# Patient Record
Sex: FEMALE | Race: WHITE | NOT HISPANIC OR LATINO | Employment: OTHER | URBAN - METROPOLITAN AREA
[De-identification: names, ages, dates, MRNs, and addresses within clinical notes are randomized per-mention and may not be internally consistent; named-entity substitution may affect disease eponyms.]

---

## 2018-01-30 ENCOUNTER — TRANSCRIBE ORDERS (OUTPATIENT)
Dept: ADMINISTRATIVE | Facility: HOSPITAL | Age: 78
End: 2018-01-30

## 2018-01-30 DIAGNOSIS — Z00.00 GENERAL MEDICAL EXAM: Primary | ICD-10-CM

## 2018-02-05 ENCOUNTER — HOSPITAL ENCOUNTER (OUTPATIENT)
Dept: BONE DENSITY | Facility: HOSPITAL | Age: 78
Discharge: HOME | End: 2018-02-05
Admitting: FAMILY MEDICINE

## 2018-02-05 DIAGNOSIS — Z00.00 GENERAL MEDICAL EXAM: ICD-10-CM

## 2018-02-05 PROCEDURE — 77080 DXA BONE DENSITY AXIAL: CPT

## 2022-10-12 ENCOUNTER — APPOINTMENT (OUTPATIENT)
Dept: GENERAL RADIOLOGY | Facility: HOSPITAL | Age: 82
End: 2022-10-12

## 2022-10-12 ENCOUNTER — HOSPITAL ENCOUNTER (INPATIENT)
Facility: HOSPITAL | Age: 82
LOS: 6 days | Discharge: HOME | End: 2022-10-18
Attending: EMERGENCY MEDICINE | Admitting: INTERNAL MEDICINE

## 2022-10-12 DIAGNOSIS — I51.81 TAKOTSUBO CARDIOMYOPATHY: ICD-10-CM

## 2022-10-12 DIAGNOSIS — I24.9 ACUTE CORONARY SYNDROME: ICD-10-CM

## 2022-10-12 DIAGNOSIS — R09.02 HYPOXIA: ICD-10-CM

## 2022-10-12 DIAGNOSIS — I44.2 HEART BLOCK AV COMPLETE: ICD-10-CM

## 2022-10-12 DIAGNOSIS — I21.3 ST ELEVATION MYOCARDIAL INFARCTION (STEMI), UNSPECIFIED ARTERY: ICD-10-CM

## 2022-10-12 DIAGNOSIS — I44.2 COMPLETE HEART BLOCK: Primary | ICD-10-CM

## 2022-10-12 PROBLEM — I34.0 NONRHEUMATIC MITRAL VALVE REGURGITATION: Status: ACTIVE | Noted: 2022-10-12

## 2022-10-12 PROBLEM — I44.7 LBBB (LEFT BUNDLE BRANCH BLOCK): Status: ACTIVE | Noted: 2022-10-12

## 2022-10-12 LAB
ALBUMIN SERPL-MCNC: 4.6 G/DL (ref 3.5–5.2)
ALBUMIN/GLOB SERPL: 1.7 G/DL
ALP SERPL-CCNC: 80 U/L (ref 39–117)
ALT SERPL W P-5'-P-CCNC: 10 U/L (ref 1–33)
ANION GAP SERPL CALCULATED.3IONS-SCNC: 19.2 MMOL/L (ref 5–15)
AST SERPL-CCNC: 35 U/L (ref 1–32)
BASOPHILS # BLD AUTO: 0.08 10*3/MM3 (ref 0–0.2)
BASOPHILS NFR BLD AUTO: 0.5 % (ref 0–1.5)
BILIRUB SERPL-MCNC: 0.6 MG/DL (ref 0–1.2)
BUN SERPL-MCNC: 26 MG/DL (ref 8–23)
BUN/CREAT SERPL: 20.8 (ref 7–25)
CALCIUM SPEC-SCNC: 10.3 MG/DL (ref 8.6–10.5)
CHLORIDE SERPL-SCNC: 103 MMOL/L (ref 98–107)
CO2 SERPL-SCNC: 16.8 MMOL/L (ref 22–29)
CREAT SERPL-MCNC: 1.25 MG/DL (ref 0.57–1)
DEPRECATED RDW RBC AUTO: 41.4 FL (ref 37–54)
EGFRCR SERPLBLD CKD-EPI 2021: 43.4 ML/MIN/1.73
EOSINOPHIL # BLD AUTO: 0.08 10*3/MM3 (ref 0–0.4)
EOSINOPHIL NFR BLD AUTO: 0.5 % (ref 0.3–6.2)
ERYTHROCYTE [DISTWIDTH] IN BLOOD BY AUTOMATED COUNT: 12.2 % (ref 12.3–15.4)
GLOBULIN UR ELPH-MCNC: 2.7 GM/DL
GLUCOSE BLDC GLUCOMTR-MCNC: 199 MG/DL (ref 70–130)
GLUCOSE SERPL-MCNC: 223 MG/DL (ref 65–99)
HCT VFR BLD AUTO: 47.3 % (ref 34–46.6)
HCT VFR BLDA CALC: 40 % (ref 38–51)
HGB BLD-MCNC: 16 G/DL (ref 12–15.9)
HGB BLDA-MCNC: 13.6 G/DL (ref 12–17)
IMM GRANULOCYTES # BLD AUTO: 0.09 10*3/MM3 (ref 0–0.05)
IMM GRANULOCYTES NFR BLD AUTO: 0.5 % (ref 0–0.5)
LIPASE SERPL-CCNC: 36 U/L (ref 13–60)
LYMPHOCYTES # BLD AUTO: 2.71 10*3/MM3 (ref 0.7–3.1)
LYMPHOCYTES NFR BLD AUTO: 16.3 % (ref 19.6–45.3)
MCH RBC QN AUTO: 31.6 PG (ref 26.6–33)
MCHC RBC AUTO-ENTMCNC: 33.8 G/DL (ref 31.5–35.7)
MCV RBC AUTO: 93.5 FL (ref 79–97)
MONOCYTES # BLD AUTO: 0.67 10*3/MM3 (ref 0.1–0.9)
MONOCYTES NFR BLD AUTO: 4 % (ref 5–12)
NEUTROPHILS NFR BLD AUTO: 13.01 10*3/MM3 (ref 1.7–7)
NEUTROPHILS NFR BLD AUTO: 78.2 % (ref 42.7–76)
NRBC BLD AUTO-RTO: 0 /100 WBC (ref 0–0.2)
NT-PROBNP SERPL-MCNC: 1242 PG/ML (ref 0–1800)
PLATELET # BLD AUTO: 287 10*3/MM3 (ref 140–450)
PMV BLD AUTO: 11.8 FL (ref 6–12)
POTASSIUM SERPL-SCNC: 5.1 MMOL/L (ref 3.5–5.2)
PROT SERPL-MCNC: 7.3 G/DL (ref 6–8.5)
QT INTERVAL: 573 MS
RBC # BLD AUTO: 5.06 10*6/MM3 (ref 3.77–5.28)
SAO2 % BLDA: 95 % (ref 95–98)
SODIUM SERPL-SCNC: 139 MMOL/L (ref 136–145)
TROPONIN T SERPL-MCNC: 0.77 NG/ML (ref 0–0.03)
WBC NRBC COR # BLD: 16.64 10*3/MM3 (ref 3.4–10.8)

## 2022-10-12 PROCEDURE — 80053 COMPREHEN METABOLIC PANEL: CPT | Performed by: EMERGENCY MEDICINE

## 2022-10-12 PROCEDURE — 93458 L HRT ARTERY/VENTRICLE ANGIO: CPT | Performed by: INTERNAL MEDICINE

## 2022-10-12 PROCEDURE — C1894 INTRO/SHEATH, NON-LASER: HCPCS | Performed by: INTERNAL MEDICINE

## 2022-10-12 PROCEDURE — 25010000002 MORPHINE SULFATE (PF) 2 MG/ML SOLUTION: Performed by: INTERNAL MEDICINE

## 2022-10-12 PROCEDURE — 82962 GLUCOSE BLOOD TEST: CPT

## 2022-10-12 PROCEDURE — 25010000002 DIPHENHYDRAMINE PER 50 MG: Performed by: EMERGENCY MEDICINE

## 2022-10-12 PROCEDURE — 0 IOPAMIDOL PER 1 ML: Performed by: INTERNAL MEDICINE

## 2022-10-12 PROCEDURE — 84484 ASSAY OF TROPONIN QUANT: CPT | Performed by: EMERGENCY MEDICINE

## 2022-10-12 PROCEDURE — 85014 HEMATOCRIT: CPT

## 2022-10-12 PROCEDURE — 5A1223Z PERFORMANCE OF CARDIAC PACING, CONTINUOUS: ICD-10-PCS | Performed by: INTERNAL MEDICINE

## 2022-10-12 PROCEDURE — 25010000002 DOPAMINE PER 40 MG: Performed by: INTERNAL MEDICINE

## 2022-10-12 PROCEDURE — 93010 ELECTROCARDIOGRAM REPORT: CPT | Performed by: INTERNAL MEDICINE

## 2022-10-12 PROCEDURE — 25010000002 FUROSEMIDE PER 20 MG: Performed by: INTERNAL MEDICINE

## 2022-10-12 PROCEDURE — 25010000002 ONDANSETRON PER 1 MG: Performed by: EMERGENCY MEDICINE

## 2022-10-12 PROCEDURE — 25010000002 DOPAMINE PER 40 MG

## 2022-10-12 PROCEDURE — 99223 1ST HOSP IP/OBS HIGH 75: CPT | Performed by: INTERNAL MEDICINE

## 2022-10-12 PROCEDURE — 82810 BLOOD GASES O2 SAT ONLY: CPT

## 2022-10-12 PROCEDURE — C1769 GUIDE WIRE: HCPCS | Performed by: INTERNAL MEDICINE

## 2022-10-12 PROCEDURE — 25010000002 METHYLPREDNISOLONE PER 125 MG: Performed by: EMERGENCY MEDICINE

## 2022-10-12 PROCEDURE — 4A023N7 MEASUREMENT OF CARDIAC SAMPLING AND PRESSURE, LEFT HEART, PERCUTANEOUS APPROACH: ICD-10-PCS | Performed by: INTERNAL MEDICINE

## 2022-10-12 PROCEDURE — 94640 AIRWAY INHALATION TREATMENT: CPT

## 2022-10-12 PROCEDURE — 85025 COMPLETE CBC W/AUTO DIFF WBC: CPT | Performed by: EMERGENCY MEDICINE

## 2022-10-12 PROCEDURE — 25010000002 HEPARIN (PORCINE) PER 1000 UNITS: Performed by: INTERNAL MEDICINE

## 2022-10-12 PROCEDURE — 93005 ELECTROCARDIOGRAM TRACING: CPT | Performed by: EMERGENCY MEDICINE

## 2022-10-12 PROCEDURE — 25010000002 MORPHINE PER 10 MG: Performed by: INTERNAL MEDICINE

## 2022-10-12 PROCEDURE — 99284 EMERGENCY DEPT VISIT MOD MDM: CPT

## 2022-10-12 PROCEDURE — 94799 UNLISTED PULMONARY SVC/PX: CPT

## 2022-10-12 PROCEDURE — B2111ZZ FLUOROSCOPY OF MULTIPLE CORONARY ARTERIES USING LOW OSMOLAR CONTRAST: ICD-10-PCS | Performed by: INTERNAL MEDICINE

## 2022-10-12 PROCEDURE — 85018 HEMOGLOBIN: CPT

## 2022-10-12 PROCEDURE — 83690 ASSAY OF LIPASE: CPT | Performed by: EMERGENCY MEDICINE

## 2022-10-12 PROCEDURE — 71045 X-RAY EXAM CHEST 1 VIEW: CPT

## 2022-10-12 PROCEDURE — 94660 CPAP INITIATION&MGMT: CPT

## 2022-10-12 PROCEDURE — 83880 ASSAY OF NATRIURETIC PEPTIDE: CPT | Performed by: EMERGENCY MEDICINE

## 2022-10-12 PROCEDURE — B2151ZZ FLUOROSCOPY OF LEFT HEART USING LOW OSMOLAR CONTRAST: ICD-10-PCS | Performed by: INTERNAL MEDICINE

## 2022-10-12 RX ORDER — METHYLPREDNISOLONE SODIUM SUCCINATE 125 MG/2ML
125 INJECTION, POWDER, LYOPHILIZED, FOR SOLUTION INTRAMUSCULAR; INTRAVENOUS ONCE
Status: COMPLETED | OUTPATIENT
Start: 2022-10-12 | End: 2022-10-12

## 2022-10-12 RX ORDER — DOPAMINE HYDROCHLORIDE 160 MG/100ML
2-20 INJECTION, SOLUTION INTRAVENOUS
Status: DISCONTINUED | OUTPATIENT
Start: 2022-10-12 | End: 2022-10-13

## 2022-10-12 RX ORDER — NALOXONE HCL 0.4 MG/ML
0.4 VIAL (ML) INJECTION
Status: DISCONTINUED | OUTPATIENT
Start: 2022-10-12 | End: 2022-10-12

## 2022-10-12 RX ORDER — DOPAMINE HYDROCHLORIDE 160 MG/100ML
INJECTION, SOLUTION INTRAVENOUS
Status: COMPLETED
Start: 2022-10-12 | End: 2022-10-12

## 2022-10-12 RX ORDER — HEPARIN SODIUM 1000 [USP'U]/ML
INJECTION, SOLUTION INTRAVENOUS; SUBCUTANEOUS
Status: DISCONTINUED | OUTPATIENT
Start: 2022-10-12 | End: 2022-10-12 | Stop reason: HOSPADM

## 2022-10-12 RX ORDER — ONDANSETRON 2 MG/ML
4 INJECTION INTRAMUSCULAR; INTRAVENOUS EVERY 6 HOURS PRN
Status: DISCONTINUED | OUTPATIENT
Start: 2022-10-12 | End: 2022-10-18 | Stop reason: HOSPADM

## 2022-10-12 RX ORDER — ALBUTEROL SULFATE 2.5 MG/3ML
2.5 SOLUTION RESPIRATORY (INHALATION) EVERY 6 HOURS PRN
Status: DISCONTINUED | OUTPATIENT
Start: 2022-10-12 | End: 2022-10-12

## 2022-10-12 RX ORDER — IPRATROPIUM BROMIDE AND ALBUTEROL SULFATE 2.5; .5 MG/3ML; MG/3ML
3 SOLUTION RESPIRATORY (INHALATION)
Status: DISCONTINUED | OUTPATIENT
Start: 2022-10-12 | End: 2022-10-14

## 2022-10-12 RX ORDER — NALOXONE HCL 0.4 MG/ML
0.4 VIAL (ML) INJECTION
Status: DISCONTINUED | OUTPATIENT
Start: 2022-10-12 | End: 2022-10-13

## 2022-10-12 RX ORDER — IPRATROPIUM BROMIDE AND ALBUTEROL SULFATE 2.5; .5 MG/3ML; MG/3ML
3 SOLUTION RESPIRATORY (INHALATION) EVERY 4 HOURS PRN
Status: DISCONTINUED | OUTPATIENT
Start: 2022-10-12 | End: 2022-10-18 | Stop reason: HOSPADM

## 2022-10-12 RX ORDER — FUROSEMIDE 10 MG/ML
20 INJECTION INTRAMUSCULAR; INTRAVENOUS ONCE
Status: COMPLETED | OUTPATIENT
Start: 2022-10-12 | End: 2022-10-12

## 2022-10-12 RX ORDER — ONDANSETRON 2 MG/ML
4 INJECTION INTRAMUSCULAR; INTRAVENOUS ONCE
Status: COMPLETED | OUTPATIENT
Start: 2022-10-12 | End: 2022-10-12

## 2022-10-12 RX ORDER — DIPHENHYDRAMINE HYDROCHLORIDE 50 MG/ML
25 INJECTION INTRAMUSCULAR; INTRAVENOUS ONCE
Status: COMPLETED | OUTPATIENT
Start: 2022-10-12 | End: 2022-10-12

## 2022-10-12 RX ORDER — SODIUM CHLORIDE 0.9 % (FLUSH) 0.9 %
10 SYRINGE (ML) INJECTION AS NEEDED
Status: DISCONTINUED | OUTPATIENT
Start: 2022-10-12 | End: 2022-10-18 | Stop reason: HOSPADM

## 2022-10-12 RX ORDER — BUPIVACAINE HYDROCHLORIDE 5 MG/ML
INJECTION, SOLUTION EPIDURAL; INTRACAUDAL
Status: DISCONTINUED | OUTPATIENT
Start: 2022-10-12 | End: 2022-10-12 | Stop reason: HOSPADM

## 2022-10-12 RX ORDER — MORPHINE SULFATE 2 MG/ML
INJECTION, SOLUTION INTRAMUSCULAR; INTRAVENOUS
Status: DISCONTINUED | OUTPATIENT
Start: 2022-10-12 | End: 2022-10-12 | Stop reason: HOSPADM

## 2022-10-12 RX ORDER — ONDANSETRON 4 MG/1
4 TABLET, FILM COATED ORAL EVERY 6 HOURS PRN
Status: DISCONTINUED | OUTPATIENT
Start: 2022-10-12 | End: 2022-10-18 | Stop reason: HOSPADM

## 2022-10-12 RX ORDER — FAMOTIDINE 10 MG/ML
20 INJECTION, SOLUTION INTRAVENOUS ONCE
Status: COMPLETED | OUTPATIENT
Start: 2022-10-12 | End: 2022-10-12

## 2022-10-12 RX ORDER — MORPHINE SULFATE 2 MG/ML
2 INJECTION, SOLUTION INTRAMUSCULAR; INTRAVENOUS EVERY 4 HOURS PRN
Status: DISCONTINUED | OUTPATIENT
Start: 2022-10-12 | End: 2022-10-13

## 2022-10-12 RX ORDER — MORPHINE SULFATE 2 MG/ML
2 INJECTION, SOLUTION INTRAMUSCULAR; INTRAVENOUS ONCE
Status: DISCONTINUED | OUTPATIENT
Start: 2022-10-12 | End: 2022-10-18 | Stop reason: HOSPADM

## 2022-10-12 RX ORDER — DOPAMINE HYDROCHLORIDE 160 MG/100ML
INJECTION, SOLUTION INTRAVENOUS
Status: DISCONTINUED | OUTPATIENT
Start: 2022-10-12 | End: 2022-10-12 | Stop reason: HOSPADM

## 2022-10-12 RX ORDER — MORPHINE SULFATE 2 MG/ML
1 INJECTION, SOLUTION INTRAMUSCULAR; INTRAVENOUS EVERY 4 HOURS PRN
Status: DISCONTINUED | OUTPATIENT
Start: 2022-10-12 | End: 2022-10-12

## 2022-10-12 RX ORDER — HYDROCODONE BITARTRATE AND ACETAMINOPHEN 5; 325 MG/1; MG/1
1 TABLET ORAL EVERY 4 HOURS PRN
Status: DISCONTINUED | OUTPATIENT
Start: 2022-10-12 | End: 2022-10-18 | Stop reason: HOSPADM

## 2022-10-12 RX ADMIN — ONDANSETRON 4 MG: 2 INJECTION INTRAMUSCULAR; INTRAVENOUS at 18:56

## 2022-10-12 RX ADMIN — METHYLPREDNISOLONE SODIUM SUCCINATE 125 MG: 125 INJECTION, POWDER, FOR SOLUTION INTRAMUSCULAR; INTRAVENOUS at 19:17

## 2022-10-12 RX ADMIN — FAMOTIDINE 20 MG: 10 INJECTION INTRAVENOUS at 19:18

## 2022-10-12 RX ADMIN — DIPHENHYDRAMINE HYDROCHLORIDE 25 MG: 50 INJECTION, SOLUTION INTRAMUSCULAR; INTRAVENOUS at 19:17

## 2022-10-12 RX ADMIN — FUROSEMIDE 20 MG: 10 INJECTION, SOLUTION INTRAMUSCULAR; INTRAVENOUS at 21:29

## 2022-10-12 RX ADMIN — MORPHINE SULFATE 2 MG: 2 INJECTION, SOLUTION INTRAMUSCULAR; INTRAVENOUS at 22:48

## 2022-10-12 RX ADMIN — DOPAMINE HYDROCHLORIDE 10 MCG/KG/MIN: 40 INJECTION, SOLUTION, CONCENTRATE INTRAVENOUS at 19:21

## 2022-10-12 RX ADMIN — HYDROCODONE BITARTRATE AND ACETAMINOPHEN 1 TABLET: 5; 325 TABLET ORAL at 21:29

## 2022-10-12 RX ADMIN — ALBUTEROL SULFATE 2.5 MG: 2.5 SOLUTION RESPIRATORY (INHALATION) at 21:18

## 2022-10-12 RX ADMIN — DOPAMINE HYDROCHLORIDE 10 MCG/KG/MIN: 160 INJECTION, SOLUTION INTRAVENOUS at 19:21

## 2022-10-12 NOTE — ED PROVIDER NOTES
EMERGENCY DEPARTMENT ENCOUNTER    Room Number:  N330/1  Date of encounter:  10/12/2022  PCP: Radu Fisher PA  Historian: Patient      HPI:  Chief Complaint: Chest pain  A complete HPI/ROS/PMH/PSH/SH/FH are unobtainable due to: Poor historian    Context: Latoya Iqbal is a 82 y.o. female who presents to the ED c/o severe left-sided, aching chest pain that started earlier today and has been persistent.  She has shortness of breath, diaphoresis and feels dizzy.  Nothing makes it better or worse and it is been fairly constant.  She has taken an aspirin for it but otherwise nothing else.    Prior to today she was not having any of these symptoms and was in her usual state of health.  She does not have a history of coronary artery disease that she is aware of, stop smoking several decades ago but does say that she has been told that she has heart failure in the past.  She does not see a cardiologist and does not remember the last time she went to see a doctor.      PAST MEDICAL HISTORY  Active Ambulatory Problems     Diagnosis Date Noted   • No Active Ambulatory Problems     Resolved Ambulatory Problems     Diagnosis Date Noted   • No Resolved Ambulatory Problems     Past Medical History:   Diagnosis Date   • Cancer (HCC)    • CHF (congestive heart failure) (HCC)    • COPD (chronic obstructive pulmonary disease) (HCC)    • Elevated cholesterol    • GERD (gastroesophageal reflux disease)    • Hypertension          PAST SURGICAL HISTORY  Past Surgical History:   Procedure Laterality Date   • APPENDECTOMY      1977   • CARDIAC CATHETERIZATION     • HYSTERECTOMY      1977   • INSERTION OF TISSUE EXPANDER AFTER MASTECTOMY Left     1996   • MASTECTOMY      1977   • REPLACEMENT TOTAL HIP LATERAL POSITION Left     1978   • SKIN BIOPSY      multple over years   • TONSILECTOMY, ADENOIDECTOMY, BILATERAL MYRINGOTOMY AND TUBES      1962         FAMILY HISTORY  History reviewed. No pertinent family history.      SOCIAL  HISTORY  Social History     Socioeconomic History   • Marital status:    Tobacco Use   • Smoking status: Former     Packs/day: 3.00     Years: 50.00     Pack years: 150.00     Types: Cigarettes   • Smokeless tobacco: Never   Vaping Use   • Vaping Use: Never used   Substance and Sexual Activity   • Alcohol use: Never   • Drug use: Never   • Sexual activity: Defer         ALLERGIES  Cortisone, Iodine, Penicillins, and Procaine        REVIEW OF SYSTEMS  Review of Systems   Limited due to acuity of condition      PHYSICAL EXAM    I have reviewed the triage vital signs and nursing notes.    ED Triage Vitals   Temp Pulse Resp BP SpO2   -- -- -- -- --      Temp src Heart Rate Source Patient Position BP Location FiO2 (%)   -- -- -- -- --       Physical Exam  GENERAL: Awake and alert, distressed and diaphoretic, emaciated looking  HENT: nares patent  EYES: no scleral icterus  CV: Bradycardia and appears to be in heart block  RESPIRATORY: Tachypneic but no significant distress  ABDOMEN: soft  MUSCULOSKELETAL: no deformity, no calf tenderness or pedal edema  NEURO: alert, moves all extremities, follows commands  SKIN: warm, dry        LAB RESULTS  Recent Results (from the past 24 hour(s))   ECG 12 Lead    Collection Time: 10/12/22  6:29 PM   Result Value Ref Range    QT Interval 573 ms   Comprehensive Metabolic Panel    Collection Time: 10/12/22  6:35 PM    Specimen: Arm, Right; Blood   Result Value Ref Range    Glucose 223 (H) 65 - 99 mg/dL    BUN 26 (H) 8 - 23 mg/dL    Creatinine 1.25 (H) 0.57 - 1.00 mg/dL    Sodium 139 136 - 145 mmol/L    Potassium 5.1 3.5 - 5.2 mmol/L    Chloride 103 98 - 107 mmol/L    CO2 16.8 (L) 22.0 - 29.0 mmol/L    Calcium 10.3 8.6 - 10.5 mg/dL    Total Protein 7.3 6.0 - 8.5 g/dL    Albumin 4.60 3.50 - 5.20 g/dL    ALT (SGPT) 10 1 - 33 U/L    AST (SGOT) 35 (H) 1 - 32 U/L    Alkaline Phosphatase 80 39 - 117 U/L    Total Bilirubin 0.6 0.0 - 1.2 mg/dL    Globulin 2.7 gm/dL    A/G Ratio 1.7 g/dL     BUN/Creatinine Ratio 20.8 7.0 - 25.0    Anion Gap 19.2 (H) 5.0 - 15.0 mmol/L    eGFR 43.4 (L) >60.0 mL/min/1.73   Lipase    Collection Time: 10/12/22  6:35 PM    Specimen: Arm, Right; Blood   Result Value Ref Range    Lipase 36 13 - 60 U/L   Troponin    Collection Time: 10/12/22  6:35 PM    Specimen: Arm, Right; Blood   Result Value Ref Range    Troponin T 0.767 (C) 0.000 - 0.030 ng/mL   BNP    Collection Time: 10/12/22  6:35 PM    Specimen: Arm, Right; Blood   Result Value Ref Range    proBNP 1,242.0 0.0 - 1,800.0 pg/mL   CBC Auto Differential    Collection Time: 10/12/22  6:35 PM    Specimen: Arm, Right; Blood   Result Value Ref Range    WBC 16.64 (H) 3.40 - 10.80 10*3/mm3    RBC 5.06 3.77 - 5.28 10*6/mm3    Hemoglobin 16.0 (H) 12.0 - 15.9 g/dL    Hematocrit 47.3 (H) 34.0 - 46.6 %    MCV 93.5 79.0 - 97.0 fL    MCH 31.6 26.6 - 33.0 pg    MCHC 33.8 31.5 - 35.7 g/dL    RDW 12.2 (L) 12.3 - 15.4 %    RDW-SD 41.4 37.0 - 54.0 fl    MPV 11.8 6.0 - 12.0 fL    Platelets 287 140 - 450 10*3/mm3    Neutrophil % 78.2 (H) 42.7 - 76.0 %    Lymphocyte % 16.3 (L) 19.6 - 45.3 %    Monocyte % 4.0 (L) 5.0 - 12.0 %    Eosinophil % 0.5 0.3 - 6.2 %    Basophil % 0.5 0.0 - 1.5 %    Immature Grans % 0.5 0.0 - 0.5 %    Neutrophils, Absolute 13.01 (H) 1.70 - 7.00 10*3/mm3    Lymphocytes, Absolute 2.71 0.70 - 3.10 10*3/mm3    Monocytes, Absolute 0.67 0.10 - 0.90 10*3/mm3    Eosinophils, Absolute 0.08 0.00 - 0.40 10*3/mm3    Basophils, Absolute 0.08 0.00 - 0.20 10*3/mm3    Immature Grans, Absolute 0.09 (H) 0.00 - 0.05 10*3/mm3    nRBC 0.0 0.0 - 0.2 /100 WBC   POC Panel 9, ISTAT    Collection Time: 10/12/22  7:50 PM    Specimen: Blood   Result Value Ref Range    Hemoglobin 13.6 12.0 - 17.0 g/dL    Hematocrit 40 38 - 51 %    O2 Saturation, Arterial 95 95 - 98 %   POC Glucose Once    Collection Time: 10/12/22  8:48 PM    Specimen: Blood   Result Value Ref Range    Glucose 199 (H) 70 - 130 mg/dL       Ordered the above labs and independently  reviewed the results.        RADIOLOGY  Cardiac Catheterization/Vascular Study, EP/CRM Study    Result Date: 10/12/2022  CARDIAC CATHETERIZATION REPORT Procedure:Left heart catheterization, insertion of a temporary pacemaker DATE OF PROCEDURE: 10/12/22 PROCEDURE PERFORMED BY: Patel Pollard MD, [facility] INDICATION FOR PROCEDURE: Complete heart block shortness of breath left bundle branch block.  DESCRIPTION OF PROCEDURE: She is emergently brought to the Cath Lab critically ill verbal consent was obtained, access was gained in his right common femoral vein using using a micropuncture technique and ultrasound guidance.  6 Citizen of the [place] short sheath was placed without difficulty and a temporary pacemaker was placed under fluoroscopic guidance with good capture into the right ventricle.  We set the pacer at 80.  We then accessed the right common femoral artery using a micropuncture technique and ultrasound guidance and a safe femoral approach confirming access in the common femoral artery. A 6-Citizen of [place] short sheath was placed without difficulty.  Intraarterial cocktail was given.  Left ventriculography was performed followed by selective injection of the left and right coronary arteries were performed using a JL-4 and JR-4 diagnostic catheter.  She was a very uncomfortable from a neck and back standpoint but otherwise patient tolerated the procedure well without early complication and EBL was minimal.  At the conclusion, the sheath were sutured in place.  She went to the CCU in critical condition FINDINGS: LEFT VENTRICULOGRAPHY: The LV pressure was 204/30 aortic pressure was 124/50.  This leaves us with a peak gradient of 80 across the LVOT.  There is some calcium on the aortic valve and in the aorta as well as the coronaries.  There is severely reduced global LV function EF is about 25% large area of mid anterior apical inferoapical severe hypokinesis to akinesis seems consistent with a possible Takotsubo cardiomyopathy.  She  has very hyperdynamic basilar function of the heart and I think almost a suicide left ventricle.  She also has severe mitral insufficiency CORONARY ANGIOGRAPHY: Left main: Normal Left anterior descending: 10% luminal irregularities proximally 20% mid disease normal distally diagonals are free of obstructive disease Ramus intermedius:Not present Circumflex: Serpiginous vessel really free of significant obstructive disease RCA: Is a dominant vessel.  Normal throughout SUMMARY: I think she went into complete heart block this may have precipitated a Takotsubo cardiomyopathy and then when she came appear was on dopamine and that really contributed to hopefully her outflow tract obstruction although she has some calcium on her aortic valve the pigtail went across pretty easily and will she has severe mitral insufficiency RECOMMENDATIONS: We will go to the CCU with the pacer in place may give her a little bit of diuretic and we will get an echocardiogram on her she is critically ill with a significant outflow tract obstruction as well as severe MR.  Her prognosis is guarded Patel Pollard MD 10/12/22 20:12 EDT     XR Chest 1 View    Result Date: 10/12/2022  PORTABLE CHEST X-RAY  HISTORY: Chest pain.  TECHNIQUE: Portable chest x-ray is correlated with chest x-ray November 9, 2016.  FINDINGS: There are external pacing pads in place. The cardiomediastinal silhouette is normal. There is emphysematous change in the lungs with pulmonary vascular engorgement and mild interstitial edema. Calcification of the right breast implant capsule is noted. No pneumothorax or pleural effusion.      External pacing leads in place with pulmonary vascular engorgement and mild interstitial edema superimposed upon emphysematous pulmonary parenchymal change.  This report was finalized on 10/12/2022 7:22 PM by Dr. Mickey Norton M.D.        I ordered the above noted radiological studies. Reviewed by me and discussed with radiologist.  See  dictation for official radiology interpretation.      PROCEDURES    Critical Care  Performed by: Bartolome Calvo MD  Authorized by: Bartolome Calvo MD     Critical care provider statement:     Critical care time (minutes):  40    Critical care time was exclusive of:  Separately billable procedures and treating other patients    Critical care was necessary to treat or prevent imminent or life-threatening deterioration of the following conditions:  Cardiac failure    Critical care was time spent personally by me on the following activities:  Development of treatment plan with patient or surrogate, discussions with consultants, evaluation of patient's response to treatment, examination of patient, ordering and performing treatments and interventions, ordering and review of laboratory studies, ordering and review of radiographic studies, pulse oximetry, re-evaluation of patient's condition and review of old charts    I assumed direction of critical care for this patient from another provider in my specialty: no      Care discussed with: admitting provider            MEDICATIONS GIVEN IN ER    Medications   sodium chloride 0.9 % flush 10 mL ( Intravenous MAR Hold 10/12/22 1930)   morphine injection 2 mg ( Intravenous MAR Hold 10/12/22 1930)   HYDROcodone-acetaminophen (NORCO) 5-325 MG per tablet 1 tablet (1 tablet Oral Given 10/12/22 2129)   ondansetron (ZOFRAN) tablet 4 mg (has no administration in time range)     Or   ondansetron (ZOFRAN) injection 4 mg (has no administration in time range)   DOPamine 400 mg in 250 mL D5W infusion (0 mcg/kg/min × 55.8 kg Intravenous Hold 10/12/22 1953)   morphine injection 2 mg (2 mg Intravenous Given 10/12/22 2248)     And   naloxone (NARCAN) injection 0.4 mg (has no administration in time range)   ipratropium-albuterol (DUO-NEB) nebulizer solution 3 mL (3 mL Nebulization Not Given 10/12/22 2230)   ipratropium-albuterol (DUO-NEB) nebulizer solution 3 mL (has no administration in  time range)   ondansetron (ZOFRAN) injection 4 mg (4 mg Intravenous Given 10/12/22 1856)   methylPREDNISolone sodium succinate (SOLU-Medrol) injection 125 mg (125 mg Intravenous Given 10/12/22 1917)   diphenhydrAMINE (BENADRYL) injection 25 mg (25 mg Intravenous Given 10/12/22 1917)   famotidine (PEPCID) injection 20 mg (20 mg Intravenous Given 10/12/22 1918)   furosemide (LASIX) injection 20 mg (20 mg Intravenous Given 10/12/22 2129)         PROGRESS, DATA ANALYSIS, CONSULTS, AND MEDICAL DECISION MAKING    All labs have been independently reviewed by me.  All radiology studies have been reviewed by me and discussed with radiologist dictating the report.   EKG's independently viewed and interpreted by me.  Discussion below represents my analysis of pertinent findings related to patient's condition, differential diagnosis, treatment plan and final disposition.        ED Course as of 10/12/22 2253   Wed Oct 12, 2022   1904 Spoke with Dr. Pollard from interventional cardiology.  No previous EKG.  Current EKG has a left bundle branch block with 5 to 7 mm ST elevations in V3 V4 concerning for ischemia patient also appears to be in complete heart block.  Rate is in the 40s [DP]   1905 We have agreed that patient needs to go to the heart Cath Lab emergently.  The staffing is been activated.  Patient is currently hemodynamically stable and alert, and were trying to treat her pain.  She is already had an aspirin [DP]   2252 She has a leukocytosis, chemistry shows some acute kidney injury [DP]   2252 Troponin 0.76 [DP]   2252 proBNP normal [DP]   2252 Chest x-ray shows perhaps some vascular engorgement [DP]   2252 Greatly appreciate Dr. Pollard coming in to evaluate the patient prior to going up to the Cath Lab.  She was becoming hypotensive in the 70s, feeling lightheaded and woozy so I decided to start some low-dose dopamine to address both the heart rate and the blood pressure as a temporizing measure to the OR. [DP]      ED  Course User Index  [DP] Bartolome Calvo MD           PPE: The patient wore a surgical mask throughout the entire patient encounter. I wore an N95.    AS OF 22:53 EDT VITALS:    BP - 120/94  HR - 60  TEMP - 96.1 °F (35.6 °C) (Axillary)  O2 SATS - (!) 87%        DIAGNOSIS  Final diagnoses:   Complete heart block (HCC)   Acute coronary syndrome (HCC)         DISPOSITION  Admit to           Bartolome Calvo MD  10/12/22 8980

## 2022-10-12 NOTE — ED NOTES
Pt arrived via [facility] ems from home w/ complaints of L-sided CP & N/V that started around 1400 10/12. Per pt she has no known cardiac history. Per ems, 324mg of aspirin prior to arrival to ER. Pt rates pain 8/10 @ this time.

## 2022-10-12 NOTE — H&P
Date of Hospital Visit: 10/12/22  Encounter Provider: Patel Pollard MD  Place of Service: [facility] CARDIOLOGY  Patient Name: Latoya Iqbal  :1940  [id number]    Chief complaint: Near syncope shortness of breath chest discomfort    History of Present Illness: 82-year-old lady with a history of COPD hypertension does not smoke anymore today around 2:00 suddenly got short of breath chest discomfort and felt lightheaded sat at home for a while then came to the emergency room here she is noted to have a left bundle branch block and is in complete heart block.  She has not had any kidney disease no history of bleeding she has some allergies including iodine and lidocaine.  She lives in [place] does not appear to have a lot of family    Past Medical History:   Diagnosis Date   • CHF (congestive heart failure) ([facility])    • COPD (chronic obstructive pulmonary disease) ([facility])        Past Surgical History:   Procedure Laterality Date   • INSERTION OF TISSUE EXPANDER AFTER MASTECTOMY Left        • REPLACEMENT TOTAL HIP LATERAL POSITION Left        • TONSILECTOMY, ADENOIDECTOMY, BILATERAL MYRINGOTOMY AND TUBES         No medications prior to admission.       Current Meds  No current facility-administered medications on file prior to encounter.     No current outpatient medications on file prior to encounter.       Social History     Socioeconomic History   • Marital status:        Family Hx: Non-contributory    REVIEW OF SYSTEMS:   ROS was performed and is negative except as outlined in HPI     REVIEW OF SYSTEMS:   CONSTITUTIONAL: No weight loss, fever, chills, weakness or fatigue.   HEENT: Eyes: No visual loss, blurred vision, double vision or yellow sclerae. Ears, Nose, Throat: No hearing loss, sneezing, congestion, runny nose or sore throat.   SKIN: No rash or itching.     RESPIRATORY: No shortness of breath, hemoptysis, cough or sputum.   GASTROINTESTINAL: No anorexia,  "nausea, vomiting or diarrhea. No abdominal pain, bright red blood per rectum or melena.  NEUROLOGICAL: No headache, dizziness, syncope, paralysis, numbness or tingling in the extremities.  MUSCULOSKELETAL: No muscle, back pain, joint pain or stiffness.   HEMATOLOGIC: No anemia, bleeding or bruising.   LYMPHATICS: No enlarged nodes.  PSYCHIATRIC: No history of depression, anxiety, hallucinations.   ENDOCRINOLOGIC: No reports of sweating, cold or heat intolerance. No polyuria or polydipsia.        Objective:     Vitals:    10/12/22 1849 10/12/22 1854 10/12/22 1903 10/12/22 1921   BP: 99/50   104/53   Pulse: (!) 46 (!) 47  (!) 43   Resp:       Temp:   97.7 °F (36.5 °C)    TempSrc:   Tympanic    SpO2: 90% 91%     Weight:       Height:         Body mass index is 19.85 kg/m².  Flowsheet Rows    Flowsheet Row First Filed Value   Admission Height 167.6 cm (66\") Documented at 10/12/2022 1827   Admission Weight 55.8 kg (123 lb) Documented at 10/12/2022 1827          General Appearance:   Thin appearing older woman just kind of very wiggly and anxious   Head:    Normocephalic, without obvious abnormality, atraumatic   Ears:    Ears appear intact with no abnormalities noted   Throat:   No oral lesions, dentition good   Neck:   No adenopathy, supple, trachea midline, no thyromegaly, no carotid bruit, no JVD   Lungs:    Breath sounds are equal and  clear to auscultation    Heart:   Normal S1 and S2, RRR, 2 out of 6 holosystolic murmur murmur/gallop or rub   Abdomen:    Normal bowel sounds, obese, soft non-tender, non-distended, no organomegaly, no guarding   Extremities:   Moves all extremities well, no edema, no cyanosis, no redness   Pulses:   Pulses palpable and equal bilaterally. Normal radial pulses   Skin:   No bleeding, bruising or rash   Lymph nodes:   No palpable adenopathy     I personally viewed and interpreted the patient's EKG/Telemetry data    Assessment:  Active Hospital Problems    Diagnosis  POA   • **STEMI (ST " elevation myocardial infarction) (HCC) [I21.3]  Yes      Resolved Hospital Problems   No resolved problems to display.       Plan: Critically ill woman left bundle branch block complete heart block probably is having a STEMI also she is at a high risk for death with this blood pressure is a little bit soft I am also concerned about her murmur she says she has had a murmur all her life it sounds like MR does not sound like a VSD to me but organ to start by putting a temporary pacemaker in and then we will go up and shoot a LV gram as well as her coronaries this is a life-threatening situation

## 2022-10-13 ENCOUNTER — APPOINTMENT (OUTPATIENT)
Dept: CARDIOLOGY | Facility: HOSPITAL | Age: 82
End: 2022-10-13

## 2022-10-13 ENCOUNTER — APPOINTMENT (OUTPATIENT)
Dept: GENERAL RADIOLOGY | Facility: HOSPITAL | Age: 82
End: 2022-10-13

## 2022-10-13 LAB
ANION GAP SERPL CALCULATED.3IONS-SCNC: 13.9 MMOL/L (ref 5–15)
AORTIC DIMENSIONLESS INDEX: 0.4 (DI)
ASCENDING AORTA: 2.9 CM
BH CV ECHO MEAS - AO MAX PG: 71 MMHG
BH CV ECHO MEAS - AO MEAN PG: 38.8 MMHG
BH CV ECHO MEAS - AO V2 MAX: 421.3 CM/SEC
BH CV ECHO MEAS - AO V2 VTI: 103.1 CM
BH CV ECHO MEAS - AVA(I,D): 1.41 CM2
BH CV ECHO MEAS - EDV(CUBED): 62.9 ML
BH CV ECHO MEAS - EDV(MOD-SP2): 95 ML
BH CV ECHO MEAS - EDV(MOD-SP4): 100 ML
BH CV ECHO MEAS - EF(MOD-BP): 50.1 %
BH CV ECHO MEAS - EF(MOD-SP2): 47.4 %
BH CV ECHO MEAS - EF(MOD-SP4): 49 %
BH CV ECHO MEAS - ESV(CUBED): 25.4 ML
BH CV ECHO MEAS - ESV(MOD-SP2): 50 ML
BH CV ECHO MEAS - ESV(MOD-SP4): 51 ML
BH CV ECHO MEAS - FS: 26 %
BH CV ECHO MEAS - IVS/LVPW: 1.12 CM
BH CV ECHO MEAS - IVSD: 1.24 CM
BH CV ECHO MEAS - LAT PEAK E' VEL: 7.1 CM/SEC
BH CV ECHO MEAS - LV DIASTOLIC VOL/BSA (35-75): 61.3 CM2
BH CV ECHO MEAS - LV MASS(C)D: 159.1 GRAMS
BH CV ECHO MEAS - LV MAX PG: 13.5 MMHG
BH CV ECHO MEAS - LV MEAN PG: 5.3 MMHG
BH CV ECHO MEAS - LV SYSTOLIC VOL/BSA (12-30): 31.3 CM2
BH CV ECHO MEAS - LV V1 MAX: 183.6 CM/SEC
BH CV ECHO MEAS - LV V1 VTI: 45.5 CM
BH CV ECHO MEAS - LVIDD: 4 CM
BH CV ECHO MEAS - LVIDS: 2.9 CM
BH CV ECHO MEAS - LVOT AREA: 3.2 CM2
BH CV ECHO MEAS - LVOT DIAM: 2.02 CM
BH CV ECHO MEAS - LVPWD: 1.11 CM
BH CV ECHO MEAS - MED PEAK E' VEL: 4.7 CM/SEC
BH CV ECHO MEAS - MR MAX PG: 134.5 MMHG
BH CV ECHO MEAS - MR MAX VEL: 579.9 CM/SEC
BH CV ECHO MEAS - MV A DUR: 0.18 SEC
BH CV ECHO MEAS - MV A MAX VEL: 148.4 CM/SEC
BH CV ECHO MEAS - MV DEC SLOPE: 408 CM/SEC2
BH CV ECHO MEAS - MV DEC TIME: 0.4 MSEC
BH CV ECHO MEAS - MV E MAX VEL: 117 CM/SEC
BH CV ECHO MEAS - MV E/A: 0.79
BH CV ECHO MEAS - MV MAX PG: 14.4 MMHG
BH CV ECHO MEAS - MV MEAN PG: 5.4 MMHG
BH CV ECHO MEAS - MV P1/2T: 109.1 MSEC
BH CV ECHO MEAS - MV V2 VTI: 59.8 CM
BH CV ECHO MEAS - MVA(P1/2T): 2.02 CM2
BH CV ECHO MEAS - MVA(VTI): 2.43 CM2
BH CV ECHO MEAS - PA ACC TIME: 0.07 SEC
BH CV ECHO MEAS - PA PR(ACCEL): 46.9 MMHG
BH CV ECHO MEAS - PA V2 MAX: 126.2 CM/SEC
BH CV ECHO MEAS - PULM A REVS DUR: 0.14 SEC
BH CV ECHO MEAS - PULM A REVS VEL: 29.2 CM/SEC
BH CV ECHO MEAS - PULM DIAS VEL: 20 CM/SEC
BH CV ECHO MEAS - PULM S/D: 1.17
BH CV ECHO MEAS - PULM SYS VEL: 23.4 CM/SEC
BH CV ECHO MEAS - QP/QS: 0.3
BH CV ECHO MEAS - RAP SYSTOLE: 3 MMHG
BH CV ECHO MEAS - RV MAX PG: 2.8 MMHG
BH CV ECHO MEAS - RV V1 MAX: 83.6 CM/SEC
BH CV ECHO MEAS - RV V1 VTI: 13.2 CM
BH CV ECHO MEAS - RVOT DIAM: 2.05 CM
BH CV ECHO MEAS - RVSP: 43 MMHG
BH CV ECHO MEAS - SI(MOD-SP2): 27.6 ML/M2
BH CV ECHO MEAS - SI(MOD-SP4): 30 ML/M2
BH CV ECHO MEAS - SV(LVOT): 145.2 ML
BH CV ECHO MEAS - SV(MOD-SP2): 45 ML
BH CV ECHO MEAS - SV(MOD-SP4): 49 ML
BH CV ECHO MEAS - SV(RVOT): 43.3 ML
BH CV ECHO MEAS - TAPSE (>1.6): 2.09 CM
BH CV ECHO MEAS - TR MAX PG: 40.2 MMHG
BH CV ECHO MEAS - TR MAX VEL: 317.1 CM/SEC
BH CV ECHO MEASUREMENTS AVERAGE E/E' RATIO: 19.83
BH CV VAS BP LEFT ARM: NORMAL MMHG
BH CV XLRA - RV BASE: 3.6 CM
BH CV XLRA - RV LENGTH: 7.5 CM
BH CV XLRA - RV MID: 3.7 CM
BH CV XLRA - TDI S': 12.1 CM/SEC
BUN SERPL-MCNC: 26 MG/DL (ref 8–23)
BUN/CREAT SERPL: 26 (ref 7–25)
CALCIUM SPEC-SCNC: 8.4 MG/DL (ref 8.6–10.5)
CHLORIDE SERPL-SCNC: 107 MMOL/L (ref 98–107)
CO2 SERPL-SCNC: 18.1 MMOL/L (ref 22–29)
CREAT SERPL-MCNC: 1 MG/DL (ref 0.57–1)
DEPRECATED RDW RBC AUTO: 41.2 FL (ref 37–54)
EGFRCR SERPLBLD CKD-EPI 2021: 56.7 ML/MIN/1.73
ERYTHROCYTE [DISTWIDTH] IN BLOOD BY AUTOMATED COUNT: 12.4 % (ref 12.3–15.4)
GLUCOSE SERPL-MCNC: 198 MG/DL (ref 65–99)
HCT VFR BLD AUTO: 39.7 % (ref 34–46.6)
HGB BLD-MCNC: 13.8 G/DL (ref 12–15.9)
MAXIMAL PREDICTED HEART RATE: 139 BPM
MCH RBC QN AUTO: 31.9 PG (ref 26.6–33)
MCHC RBC AUTO-ENTMCNC: 34.8 G/DL (ref 31.5–35.7)
MCV RBC AUTO: 91.9 FL (ref 79–97)
PLATELET # BLD AUTO: 260 10*3/MM3 (ref 140–450)
PMV BLD AUTO: 12 FL (ref 6–12)
POTASSIUM SERPL-SCNC: 4.7 MMOL/L (ref 3.5–5.2)
QT INTERVAL: 369 MS
QT INTERVAL: 493 MS
QT INTERVAL: 645 MS
RBC # BLD AUTO: 4.32 10*6/MM3 (ref 3.77–5.28)
SINUS: 3.5 CM
SODIUM SERPL-SCNC: 139 MMOL/L (ref 136–145)
STJ: 2.8 CM
STRESS TARGET HR: 118 BPM
TROPONIN T SERPL-MCNC: 1.02 NG/ML (ref 0–0.03)
WBC NRBC COR # BLD: 9.53 10*3/MM3 (ref 3.4–10.8)

## 2022-10-13 PROCEDURE — 0 IOPAMIDOL PER 1 ML: Performed by: INTERNAL MEDICINE

## 2022-10-13 PROCEDURE — 25010000002 PHENYLEPHRINE 10 MG/ML SOLUTION: Performed by: INTERNAL MEDICINE

## 2022-10-13 PROCEDURE — 33208 INSRT HEART PM ATRIAL & VENT: CPT | Performed by: INTERNAL MEDICINE

## 2022-10-13 PROCEDURE — 93306 TTE W/DOPPLER COMPLETE: CPT

## 2022-10-13 PROCEDURE — C1769 GUIDE WIRE: HCPCS | Performed by: INTERNAL MEDICINE

## 2022-10-13 PROCEDURE — 94761 N-INVAS EAR/PLS OXIMETRY MLT: CPT

## 2022-10-13 PROCEDURE — 99233 SBSQ HOSP IP/OBS HIGH 50: CPT | Performed by: INTERNAL MEDICINE

## 2022-10-13 PROCEDURE — 25010000002 PERFLUTREN (DEFINITY) 8.476 MG IN SODIUM CHLORIDE (PF) 0.9 % 10 ML INJECTION: Performed by: INTERNAL MEDICINE

## 2022-10-13 PROCEDURE — 02H63JZ INSERTION OF PACEMAKER LEAD INTO RIGHT ATRIUM, PERCUTANEOUS APPROACH: ICD-10-PCS | Performed by: INTERNAL MEDICINE

## 2022-10-13 PROCEDURE — C1894 INTRO/SHEATH, NON-LASER: HCPCS | Performed by: INTERNAL MEDICINE

## 2022-10-13 PROCEDURE — 0 LIDOCAINE 1 % SOLUTION: Performed by: INTERNAL MEDICINE

## 2022-10-13 PROCEDURE — 25010000002 FENTANYL CITRATE (PF) 50 MCG/ML SOLUTION: Performed by: INTERNAL MEDICINE

## 2022-10-13 PROCEDURE — 25010000002 MORPHINE PER 10 MG: Performed by: INTERNAL MEDICINE

## 2022-10-13 PROCEDURE — 94799 UNLISTED PULMONARY SVC/PX: CPT

## 2022-10-13 PROCEDURE — C2621 PMKR, OTHER THAN SING/DUAL: HCPCS | Performed by: INTERNAL MEDICINE

## 2022-10-13 PROCEDURE — 99221 1ST HOSP IP/OBS SF/LOW 40: CPT | Performed by: INTERNAL MEDICINE

## 2022-10-13 PROCEDURE — 25010000002 MIDAZOLAM PER 1 MG: Performed by: INTERNAL MEDICINE

## 2022-10-13 PROCEDURE — 93306 TTE W/DOPPLER COMPLETE: CPT | Performed by: INTERNAL MEDICINE

## 2022-10-13 PROCEDURE — 25010000002 VANCOMYCIN PER 500 MG: Performed by: INTERNAL MEDICINE

## 2022-10-13 PROCEDURE — 02HL3JZ INSERTION OF PACEMAKER LEAD INTO LEFT VENTRICLE, PERCUTANEOUS APPROACH: ICD-10-PCS | Performed by: INTERNAL MEDICINE

## 2022-10-13 PROCEDURE — 99153 MOD SED SAME PHYS/QHP EA: CPT | Performed by: INTERNAL MEDICINE

## 2022-10-13 PROCEDURE — 93010 ELECTROCARDIOGRAM REPORT: CPT | Performed by: INTERNAL MEDICINE

## 2022-10-13 PROCEDURE — 85027 COMPLETE CBC AUTOMATED: CPT | Performed by: INTERNAL MEDICINE

## 2022-10-13 PROCEDURE — 94660 CPAP INITIATION&MGMT: CPT

## 2022-10-13 PROCEDURE — 02HK3JZ INSERTION OF PACEMAKER LEAD INTO RIGHT VENTRICLE, PERCUTANEOUS APPROACH: ICD-10-PCS | Performed by: INTERNAL MEDICINE

## 2022-10-13 PROCEDURE — 33225 L VENTRIC PACING LEAD ADD-ON: CPT | Performed by: INTERNAL MEDICINE

## 2022-10-13 PROCEDURE — 93005 ELECTROCARDIOGRAM TRACING: CPT | Performed by: INTERNAL MEDICINE

## 2022-10-13 PROCEDURE — C1898 LEAD, PMKR, OTHER THAN TRANS: HCPCS | Performed by: INTERNAL MEDICINE

## 2022-10-13 PROCEDURE — 99152 MOD SED SAME PHYS/QHP 5/>YRS: CPT | Performed by: INTERNAL MEDICINE

## 2022-10-13 PROCEDURE — C1892 INTRO/SHEATH,FIXED,PEEL-AWAY: HCPCS | Performed by: INTERNAL MEDICINE

## 2022-10-13 PROCEDURE — 94664 DEMO&/EVAL PT USE INHALER: CPT

## 2022-10-13 PROCEDURE — 84484 ASSAY OF TROPONIN QUANT: CPT | Performed by: INTERNAL MEDICINE

## 2022-10-13 PROCEDURE — 80048 BASIC METABOLIC PNL TOTAL CA: CPT | Performed by: INTERNAL MEDICINE

## 2022-10-13 PROCEDURE — 0JH607Z INSERTION OF CARDIAC RESYNCHRONIZATION PACEMAKER PULSE GENERATOR INTO CHEST SUBCUTANEOUS TISSUE AND FASCIA, OPEN APPROACH: ICD-10-PCS | Performed by: INTERNAL MEDICINE

## 2022-10-13 PROCEDURE — 71045 X-RAY EXAM CHEST 1 VIEW: CPT

## 2022-10-13 PROCEDURE — C1900 LEAD, CORONARY VENOUS: HCPCS | Performed by: INTERNAL MEDICINE

## 2022-10-13 DEVICE — CARDIAC RESYNCHRONIZATION THERAPY PACEMAKER
Type: IMPLANTABLE DEVICE | Status: FUNCTIONAL
Brand: VISIONIST™ X4 CRT-P

## 2022-10-13 DEVICE — PACE/SENSE LEAD
Type: IMPLANTABLE DEVICE | Status: FUNCTIONAL
Brand: ACUITY™ X4 STRAIGHT

## 2022-10-13 DEVICE — PACE/SENSE LEAD
Type: IMPLANTABLE DEVICE | Status: FUNCTIONAL
Brand: INGEVITY™+

## 2022-10-13 RX ORDER — OMEPRAZOLE 20 MG/1
20 CAPSULE, DELAYED RELEASE ORAL DAILY
COMMUNITY
End: 2022-11-08 | Stop reason: ALTCHOICE

## 2022-10-13 RX ORDER — LIDOCAINE HYDROCHLORIDE 10 MG/ML
INJECTION, SOLUTION INFILTRATION; PERINEURAL
Status: DISCONTINUED | OUTPATIENT
Start: 2022-10-13 | End: 2022-10-13 | Stop reason: HOSPADM

## 2022-10-13 RX ORDER — LIDOCAINE 50 MG/G
1 PATCH TOPICAL DAILY PRN
COMMUNITY

## 2022-10-13 RX ORDER — SODIUM CHLORIDE 0.9 % (FLUSH) 0.9 %
10 SYRINGE (ML) INJECTION AS NEEDED
Status: DISCONTINUED | OUTPATIENT
Start: 2022-10-13 | End: 2022-10-18 | Stop reason: HOSPADM

## 2022-10-13 RX ORDER — FLUTICASONE PROPIONATE 50 MCG
2 SPRAY, SUSPENSION (ML) NASAL DAILY
COMMUNITY

## 2022-10-13 RX ORDER — ACETAMINOPHEN 500 MG
500 TABLET ORAL 4 TIMES DAILY PRN
COMMUNITY

## 2022-10-13 RX ORDER — ROSUVASTATIN CALCIUM 20 MG/1
20 TABLET, COATED ORAL DAILY
COMMUNITY
End: 2022-11-22

## 2022-10-13 RX ORDER — ONDANSETRON 2 MG/ML
4 INJECTION INTRAMUSCULAR; INTRAVENOUS EVERY 6 HOURS PRN
Status: DISCONTINUED | OUTPATIENT
Start: 2022-10-13 | End: 2022-10-18 | Stop reason: HOSPADM

## 2022-10-13 RX ORDER — FLUTICASONE PROPIONATE AND SALMETEROL 500; 50 UG/1; UG/1
1 POWDER RESPIRATORY (INHALATION)
COMMUNITY
End: 2022-11-08 | Stop reason: ALTCHOICE

## 2022-10-13 RX ORDER — FERROUS SULFATE 325(65) MG
325 TABLET ORAL
COMMUNITY

## 2022-10-13 RX ORDER — TIZANIDINE 4 MG/1
4 TABLET ORAL DAILY PRN
COMMUNITY

## 2022-10-13 RX ORDER — AMLODIPINE BESYLATE 5 MG/1
5 TABLET ORAL DAILY
COMMUNITY
End: 2022-10-18 | Stop reason: HOSPADM

## 2022-10-13 RX ORDER — FENTANYL CITRATE 50 UG/ML
INJECTION, SOLUTION INTRAMUSCULAR; INTRAVENOUS
Status: DISCONTINUED | OUTPATIENT
Start: 2022-10-13 | End: 2022-10-13 | Stop reason: HOSPADM

## 2022-10-13 RX ORDER — MIDAZOLAM HYDROCHLORIDE 1 MG/ML
INJECTION INTRAMUSCULAR; INTRAVENOUS
Status: DISCONTINUED | OUTPATIENT
Start: 2022-10-13 | End: 2022-10-13 | Stop reason: HOSPADM

## 2022-10-13 RX ORDER — MORPHINE SULFATE 2 MG/ML
4 INJECTION, SOLUTION INTRAMUSCULAR; INTRAVENOUS
Status: DISCONTINUED | OUTPATIENT
Start: 2022-10-13 | End: 2022-10-18 | Stop reason: HOSPADM

## 2022-10-13 RX ORDER — LORATADINE 10 MG/1
10 TABLET ORAL DAILY PRN
COMMUNITY

## 2022-10-13 RX ORDER — VALACYCLOVIR HYDROCHLORIDE 500 MG/1
500 TABLET, FILM COATED ORAL DAILY
COMMUNITY

## 2022-10-13 RX ORDER — ALENDRONATE SODIUM 70 MG/1
70 TABLET ORAL
COMMUNITY

## 2022-10-13 RX ORDER — MORPHINE SULFATE 2 MG/ML
2 INJECTION, SOLUTION INTRAMUSCULAR; INTRAVENOUS
Status: DISCONTINUED | OUTPATIENT
Start: 2022-10-13 | End: 2022-10-18 | Stop reason: HOSPADM

## 2022-10-13 RX ORDER — SODIUM CHLORIDE 0.9 % (FLUSH) 0.9 %
3 SYRINGE (ML) INJECTION EVERY 12 HOURS SCHEDULED
Status: DISCONTINUED | OUTPATIENT
Start: 2022-10-13 | End: 2022-10-18 | Stop reason: HOSPADM

## 2022-10-13 RX ORDER — ACETAMINOPHEN 325 MG/1
650 TABLET ORAL EVERY 4 HOURS PRN
Status: DISCONTINUED | OUTPATIENT
Start: 2022-10-13 | End: 2022-10-18 | Stop reason: HOSPADM

## 2022-10-13 RX ORDER — VANCOMYCIN HYDROCHLORIDE 1 G/200ML
INJECTION, SOLUTION INTRAVENOUS
Status: COMPLETED | OUTPATIENT
Start: 2022-10-13 | End: 2022-10-13

## 2022-10-13 RX ORDER — ALBUTEROL SULFATE 90 UG/1
2 AEROSOL, METERED RESPIRATORY (INHALATION) EVERY 4 HOURS PRN
COMMUNITY
End: 2022-11-08 | Stop reason: ALTCHOICE

## 2022-10-13 RX ORDER — NALOXONE HCL 0.4 MG/ML
0.4 VIAL (ML) INJECTION
Status: DISCONTINUED | OUTPATIENT
Start: 2022-10-13 | End: 2022-10-18 | Stop reason: HOSPADM

## 2022-10-13 RX ORDER — PHENYLEPHRINE HYDROCHLORIDE 10 MG/ML
INJECTION INTRAVENOUS
Status: DISCONTINUED | OUTPATIENT
Start: 2022-10-13 | End: 2022-10-13 | Stop reason: HOSPADM

## 2022-10-13 RX ORDER — MECLIZINE HYDROCHLORIDE 25 MG/1
25 TABLET ORAL 2 TIMES DAILY PRN
COMMUNITY

## 2022-10-13 RX ORDER — CEFAZOLIN SODIUM 2 G/100ML
2 INJECTION, SOLUTION INTRAVENOUS ONCE
Status: DISCONTINUED | OUTPATIENT
Start: 2022-10-13 | End: 2022-10-18 | Stop reason: HOSPADM

## 2022-10-13 RX ADMIN — IPRATROPIUM BROMIDE AND ALBUTEROL SULFATE 3 ML: .5; 3 SOLUTION RESPIRATORY (INHALATION) at 19:33

## 2022-10-13 RX ADMIN — PERFLUTREN 2.5 ML: 6.52 INJECTION, SUSPENSION INTRAVENOUS at 11:41

## 2022-10-13 RX ADMIN — Medication 3 ML: at 20:05

## 2022-10-13 RX ADMIN — MORPHINE SULFATE 4 MG: 2 INJECTION, SOLUTION INTRAMUSCULAR; INTRAVENOUS at 12:30

## 2022-10-13 RX ADMIN — MORPHINE SULFATE 4 MG: 2 INJECTION, SOLUTION INTRAMUSCULAR; INTRAVENOUS at 00:38

## 2022-10-13 RX ADMIN — MORPHINE SULFATE 4 MG: 2 INJECTION, SOLUTION INTRAMUSCULAR; INTRAVENOUS at 19:09

## 2022-10-13 RX ADMIN — MORPHINE SULFATE 2 MG: 2 INJECTION, SOLUTION INTRAMUSCULAR; INTRAVENOUS at 10:35

## 2022-10-13 RX ADMIN — HYDROCODONE BITARTRATE AND ACETAMINOPHEN 1 TABLET: 5; 325 TABLET ORAL at 22:30

## 2022-10-13 RX ADMIN — MORPHINE SULFATE 4 MG: 2 INJECTION, SOLUTION INTRAMUSCULAR; INTRAVENOUS at 22:40

## 2022-10-13 RX ADMIN — MORPHINE SULFATE 4 MG: 2 INJECTION, SOLUTION INTRAMUSCULAR; INTRAVENOUS at 21:17

## 2022-10-13 RX ADMIN — HYDROCODONE BITARTRATE AND ACETAMINOPHEN 1 TABLET: 5; 325 TABLET ORAL at 03:48

## 2022-10-13 RX ADMIN — IPRATROPIUM BROMIDE AND ALBUTEROL SULFATE 3 ML: .5; 3 SOLUTION RESPIRATORY (INHALATION) at 12:06

## 2022-10-13 RX ADMIN — IPRATROPIUM BROMIDE AND ALBUTEROL SULFATE 3 ML: .5; 3 SOLUTION RESPIRATORY (INHALATION) at 07:23

## 2022-10-13 RX ADMIN — HYDROCODONE BITARTRATE AND ACETAMINOPHEN 1 TABLET: 5; 325 TABLET ORAL at 13:47

## 2022-10-13 NOTE — PROGRESS NOTES
Discharge Planning Assessment  [facility]     Patient Name: Latoya Iqbal  MRN: [id number]  Today's Date: 10/13/2022    Admit Date: 10/12/2022    Plan: Home  Pt denies needs   Discharge Needs Assessment     Row Name 10/13/22 1429       Living Environment    People in Home alone    Current Living Arrangements apartment    Potentially Unsafe Housing Conditions unable to assess    Primary Care Provided by self    Provides Primary Care For no one    Family Caregiver if Needed sibling(s);friend(s)    Quality of Family Relationships unable to assess    Able to Return to Prior Arrangements yes       Resource/Environmental Concerns    Resource/Environmental Concerns none    Home Accessibility Concerns other (see comments)    Transportation Concerns none       Transition Planning    Patient/Family Anticipates Transition to home    Transportation Anticipated family or friend will provide       Discharge Needs Assessment    Equipment Currently Used at Home shower chair;nebulizer    Concerns to be Addressed discharge planning    Anticipated Changes Related to Illness none    Equipment Needed After Discharge none               Discharge Plan     Row Name 10/13/22 1438       Plan    Plan Home  Pt denies needs    Plan Comments IMM noted.  CCP spoke to patient at bedside.  CCP role explained.  Discharge planning discussed.  Pt emergency contact is her sister Roxane.  Pt obtains her medications from [facility] pharmacy or [email or web address].   Her PCP is Dr. Svetlana Burrows at [facility].  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  She sates she has multiple stairs to climb only at work.  Pt is independent with ADL’s.  She uses no DME to ambulate.   She has no past history of rehab.  She has no history of HH.   Plan is home with no needs.    CCP to follow up.              Continued Care and Services - Admitted Since 10/12/2022    Coordination has not been started for this encounter.           Demographic Summary    No documentation.                Functional Status    No documentation.                Psychosocial    No documentation.                Abuse/Neglect    No documentation.                Legal    No documentation.                Substance Abuse    No documentation.                Patient Forms    No documentation.                   Nina Herzog RN

## 2022-10-13 NOTE — PROGRESS NOTES
"Latoya Iqbal  1940 82 y.o.  [id number]      Patient Care Team:  Laure Burrows MD as PCP - General (Internal Medicine)    CC: Complete heart block, status post temp pacer, cardiogenic shock, severely reduced LV function EF is 25% possible Takotsubo, severe MR in the Cath Lab and 80 mm gradient across the LVOT.  No significant coronary disease    Interval History: She is improved today      Objective   Vital Signs  Temp:  [93.9 °F (34.4 °C)-98.5 °F (36.9 °C)] 97.7 °F (36.5 °C)  Heart Rate:  [43-80] 60  Resp:  [12-30] 12  BP: ()/(50-97) 101/68  Arterial Line BP: ()/(49-76) 91/49    Intake/Output Summary (Last 24 hours) at 10/13/2022 1634  Last data filed at 10/13/2022 1100  Gross per 24 hour   Intake 2095 ml   Output 774 ml   Net 1321 ml     Flowsheet Rows    Flowsheet Row First Filed Value   Admission Height 167.6 cm (66\") Documented at 10/12/2022 1827   Admission Weight 55.8 kg (123 lb) Documented at 10/12/2022 1827          Physical Exam:   General Appearance:    Alert,oriented, in no acute distress   Lungs:     Clear to auscultation,BS are equal    Heart:    Normal S1 and S2, RRR with 2 out of 6 holosystolic murmur, gallop or rub   HEENT:    Sclerae are clear, no JVD or adenopathy   Abdomen:     Normal bowel sounds, soft nontender, nondistended, no HSM   Extremities:   Moves all extremities well, no edema, no cyanosis, no             Redness, no rash     Medication Review:      ceFAZolin, 2 g, Intravenous, Once  [MAR Hold] ipratropium-albuterol, 3 mL, Nebulization, 4x Daily - RT  Morphine, 2 mg, Intravenous, Once      DOPamine, 2-20 mcg/kg/min, Last Rate: Stopped (10/12/22 1953)  vancomycin,           I reviewed the patient's new clinical results.  I personally viewed and interpreted the patient's EKG/Telemetry data    Assessment/Plan  Active Hospital Problems    Diagnosis  POA   • Takotsubo cardiomyopathy [I51.81]  Yes   • Heart block AV complete (HCC) [I44.2]  Yes   • LBBB (left bundle " branch block) [I44.7]  Yes   • Nonrheumatic mitral valve regurgitation [I34.0]  Yes      Resolved Hospital Problems   No resolved problems to display.       I think this is a lady who has not seek out a lot of medical care probably had chronic left bundle branch block, has had a history of a murmur probably has mitral insufficiency and yesterday went into complete heart block and cardiogenic shock but the surprising thing is her aortic valve I am not really sure how bad it is there is some calcium on it but there was an 80 mm gradient across it now at that time she was on dopamine at a high dose and the base and LVOT are really hyperdynamic and probably were contributing to the outflow obstruction her echo today has not been read but its really technically difficult echo.  She is going down to get a permanent pacemaker likely a CRT device with Dr. Interiano.  I think she may need a ROSITA to look at her valves but as of today does not really look like a surgical candidate.  Also may want to see if she recovers from her what I think is a Takotsubo.  This complex case    Patel Pollard MD  10/13/22  16:34 EDT

## 2022-10-13 NOTE — PROGRESS NOTES
[facility]  [phone]  Dr. Ten Dwyer    Subjective:  LOS: 1    Chief Complaint:  CHF    Seen 10/13/22 but note not completed.  Awake and alert. Some left upper chest pain - musculoskeletal and reproducible with palpation. Otherwise without complaints.    Objective   Vital Signs past 24hrs    Temp range: Temp (24hrs), Av.7 °F (35.9 °C), Min:93.9 °F (34.4 °C), Max:98.5 °F (36.9 °C)    BP range: BP: ()/(50-97) 120/61  Pulse range: Heart Rate:  [43-80] 60  Resp rate range: Resp:  [12-30] 20    Device (Oxygen Therapy): humidified;high-flow nasal cannulaFlow (L/min):  [3-15] 10  Oxygen range:SpO2:  [78 %-100 %] 92 %      55.8 kg (123 lb); Body mass index is 19.85 kg/m².    Intake/Output Summary (Last 24 hours) at 10/13/2022 1015  Last data filed at 10/13/2022 0915  Gross per 24 hour   Intake 2095 ml   Output 774 ml   Net 1321 ml       Physical Exam  Constitutional:       Appearance: She is underweight.   Cardiovascular:      Rate and Rhythm: Normal rate and regular rhythm.   Pulmonary:      Effort: Pulmonary effort is normal.      Breath sounds: Decreased breath sounds present.   Chest:      Comments: Left breast implant  Abdominal:      General: Bowel sounds are normal.      Palpations: Abdomen is soft. There is no mass.      Tenderness: There is no abdominal tenderness.   Musculoskeletal:         General: No swelling.   Neurological:      Mental Status: She is alert.        Relatively clear  Paced  Left breast implant    Results Review:    I have reviewed the laboratory and imaging data since the last note by [facility] physician.  My annotations are noted in assessment and plan.    Medication Review:  I have reviewed the current MAR.  My annotations are noted in assessment and plan.    DOPamine, 2-20 mcg/kg/min, Last Rate: Stopped (10/12/22 1953)      Plan   PCCM Problems  Complete heart block with temporary pacer  Acute MI  Cardiomyopathy  Severe mitral insufficiency  Acute pulmonary  edema  Acute hypoxia  COPD        THESE ARE NEW MEDICAL PROBLEMS TO ME.    Plan of Treatment    Patient doing relatively well with pacemaker.  She was diuresed with some improvement.  Dr. Garcia plans to put a pacemaker in her.  I reviewed her EKG because of left upper chest pain.  This looks unremarkable and I believe the pain is musculoskeletal.  Note possible plans for ROSITA due to severe mitral insufficiency.    Remains on DuoNebs 4 times daily without exacerbation of COPD.  No need for systemic steroids at this time.        Ten Dwyer MD  10/13/22  10:15 EDT      Part of this note may be an electronic transcription/translation of spoken language to printed text using the Dragon Dictation System.

## 2022-10-13 NOTE — PLAN OF CARE
Goal Outcome Evaluation:  Plan of Care Reviewed With: patient        Progress: improving  Outcome Evaluation: pt came in with CP to ER over night. hypotensive in complete heart block.  pt taken to cath lab then to CCU.  arterial sheath and venus sheath left in place.  chief complaint over night was chronic back pain issues.

## 2022-10-13 NOTE — CONSULTS
Patient Name: Latoya Iqbal  Age/Sex: 82 y.o. female  : 1940  MRN: [id number]    Date of Admission: 10/12/2022  Date of Encounter Visit: 10/13/22  Encounter Provider: Tio Interiano MD  Place of Service: [facility] CARDIOLOGY      Referring Provider: Patel Pollard MD  Patient Care Team:  Laure Burrows MD as PCP - General (Internal Medicine)    Subjective:       Chief Complaint:   Complete Heart Block    History of Present Illness:  Latoya Iqbal is a 82 y.o. female who presented last night with complete heart block and shock.      She presented to the ER yesterday after sudden onset diaphoresis and weakness.     She was in complete heart block with LBB escape    She had emergent placement of pacing catheter, coronary angiography with no significant disease, and LV angiography with severe decreased EF.      Echocardiography show severe depressed EF and severe valvular calcification.     Her mental status has significantly improved, and she is hemodynamically stable but requiring oxygen.     We were able to obtain and echo report from 2020 that describes normal ejection fraction.           Past Medical History:  Past Medical History:   Diagnosis Date   • Cancer (HCC)    • CHF (congestive heart failure) (HCC)    • COPD (chronic obstructive pulmonary disease) (HCC)    • Elevated cholesterol    • GERD (gastroesophageal reflux disease)    • Hypertension        Past Surgical History:   Procedure Laterality Date   • APPENDECTOMY         • CARDIAC CATHETERIZATION     • CARDIAC CATHETERIZATION N/A 10/12/2022    Procedure: Left Heart Cath;  Surgeon: Patel Pollard MD;  Location: [facility] CATH INVASIVE LOCATION;  Service: Cardiovascular;  Laterality: N/A;   • CARDIAC CATHETERIZATION N/A 10/12/2022    Procedure: Left ventriculography;  Surgeon: Patel Pollard MD;  Location:  [facility] CATH INVASIVE LOCATION;  Service: Cardiovascular;  Laterality: N/A;   • CARDIAC  ELECTROPHYSIOLOGY PROCEDURE N/A 10/12/2022    Procedure: Temporary Pacemaker;  Surgeon: Patel Pollard MD;  Location: [facility] INVASIVE LOCATION;  Service: Cardiovascular;  Laterality: N/A;   • HYSTERECTOMY      1977   • INSERTION OF TISSUE EXPANDER AFTER MASTECTOMY Left     1996   • MASTECTOMY      1977   • REPLACEMENT TOTAL HIP LATERAL POSITION Left     1978   • SKIN BIOPSY      multple over years   • TONSILECTOMY, ADENOIDECTOMY, BILATERAL MYRINGOTOMY AND TUBES      1962       Home Medications:   Medications Prior to Admission   Medication Sig Dispense Refill Last Dose   • acetaminophen (TYLENOL) 500 MG tablet Take 1 tablet by mouth 4 (Four) Times a Day As Needed for Mild Pain.      • albuterol sulfate  (90 Base) MCG/ACT inhaler Inhale 2 puffs Every 4 (Four) Hours As Needed for Wheezing or Shortness of Air.      • alendronate (FOSAMAX) 70 MG tablet Take 1 tablet by mouth Every 7 (Seven) Days.      • amLODIPine (NORVASC) 5 MG tablet Take 1 tablet by mouth Daily.      • COENZYME Q10 PO Take 10 mg by mouth Daily.      • ferrous sulfate 325 (65 FE) MG tablet Take 1 tablet by mouth 2 (Two) Times a Day With Meals.      • fluticasone (FLONASE) 50 MCG/ACT nasal spray 2 sprays into the nostril(s) as directed by provider Daily.      • lidocaine (LIDODERM) 5 % Place 1 patch on the skin as directed by provider Daily As Needed for Mild Pain or Moderate Pain. Remove & Discard patch within 12 hours or as directed by MD      • loratadine (CLARITIN) 10 MG tablet Take 1 tablet by mouth Daily As Needed for Allergies.      • meclizine (ANTIVERT) 25 MG tablet Take 1 tablet by mouth 2 (Two) Times a Day As Needed for Dizziness.      • omeprazole (priLOSEC) 20 MG capsule Take 1 capsule by mouth Daily.      • rosuvastatin (CRESTOR) 20 MG tablet Take 1 tablet by mouth Daily.      • tiZANidine (ZANAFLEX) 4 MG tablet Take 1 tablet by mouth Daily As Needed for Muscle Spasms.      • valACYclovir (VALTREX) 500 MG tablet Take 1  tablet by mouth Daily.      • Fluticasone-Salmeterol (ADVAIR/WIXELA) 500-50 MCG/ACT DISKUS Inhale 1 puff 2 (Two) Times a Day.          Allergies:  Allergies   Allergen Reactions   • Cortisone Seizure   • Iodine Other (See Comments)   • Penicillins Unknown - Low Severity   • Procaine Seizure       Past Social History:  Social History     Socioeconomic History   • Marital status:    Tobacco Use   • Smoking status: Former     Packs/day: 3.00     Years: 50.00     Pack years: 150.00     Types: Cigarettes   • Smokeless tobacco: Never   Vaping Use   • Vaping Use: Never used   Substance and Sexual Activity   • Alcohol use: Never   • Drug use: Never   • Sexual activity: Defer        Past Family History:  History reviewed. No pertinent family history.    Review of Systems: All systems reviewed. Pertinent positives identified in HPI. All other systems are negative.     Review of Systems   Constitutional: Positive for diaphoresis and malaise/fatigue.   HENT: Negative.    Eyes: Negative.    Cardiovascular: Positive for near-syncope. Negative for chest pain, dyspnea on exertion and leg swelling.   Respiratory: Positive for shortness of breath. Negative for cough.    Endocrine: Negative.    Hematologic/Lymphatic: Negative.    Skin: Negative.    Musculoskeletal: Negative.    Gastrointestinal: Negative.    Genitourinary: Negative.    Neurological: Negative.  Negative for weakness.   Psychiatric/Behavioral: Negative.    Allergic/Immunologic: Negative.          Objective:     Objective:  Temp:  [93.9 °F (34.4 °C)-98.5 °F (36.9 °C)] 97.7 °F (36.5 °C)  Heart Rate:  [43-80] 60  Resp:  [12-30] 20  BP: ()/(50-97) 101/68  Arterial Line BP: ()/(49-76) 91/49    Intake/Output Summary (Last 24 hours) at 10/13/2022 1738  Last data filed at 10/13/2022 1100  Gross per 24 hour   Intake 2095 ml   Output 774 ml   Net 1321 ml     Body mass index is 19.84 kg/m².      10/12/22  1827 10/13/22  1140   Weight: 55.8 kg (123 lb) 56 kg  (123 lb 7.3 oz)           Physical Exam:   Vitals and nursing note reviewed.   Constitutional:       Appearance: Underweight. Frail.      Interventions: Nasal cannula in place.   HENT:      Head: Normocephalic and atraumatic.    Mouth/Throat:      Pharynx: Oropharynx is clear.   Chest:   Breasts:     Breasts are asymmetrical.      Comments: She has a breast implant on the left.  It is enlarged and hard.  There is a ruptured implant on the right.   Cardiovascular:      Normal rate. Regular rhythm.      Murmurs: There is a systolic murmur.   Edema:     Peripheral edema absent.   Skin:     General: Skin is warm.   Neurological:      General: No focal deficit present.      Mental Status: Alert and oriented to person, place and time.   Psychiatric:         Attention and Perception: Attention and perception normal.         Mood and Affect: Mood and affect normal.         Behavior: Behavior is cooperative.           Lab Review:     Results from last 7 days   Lab Units 10/13/22  0342 10/12/22  1835   SODIUM mmol/L 139 139   POTASSIUM mmol/L 4.7 5.1   CHLORIDE mmol/L 107 103   CO2 mmol/L 18.1* 16.8*   BUN mg/dL 26* 26*   CREATININE mg/dL 1.00 1.25*   GLUCOSE mg/dL 198* 223*   CALCIUM mg/dL 8.4* 10.3       Results from last 7 days   Lab Units 10/13/22  0342 10/12/22  1835   TROPONIN T ng/mL 1.020* 0.767*     Results from last 7 days   Lab Units 10/13/22  0342   WBC 10*3/mm3 9.53   HEMOGLOBIN g/dL 13.8   HEMATOCRIT % 39.7   PLATELETS 10*3/mm3 260                     Results from last 7 days   Lab Units 10/12/22  1835   PROBNP pg/mL 1,242.0               Imaging:    Imaging Results (Most Recent)     Procedure Component Value Units Date/Time    XR Chest 1 View [[id number]] Collected: 10/12/22 1921     Updated: 10/12/22 1925    Narrative:      PORTABLE CHEST X-RAY     HISTORY: Chest pain.     TECHNIQUE: Portable chest x-ray is correlated with chest x-ray November 9, 2016.     FINDINGS: There are external pacing pads in place. The  cardiomediastinal  silhouette is normal. There is emphysematous change in the lungs with  pulmonary vascular engorgement and mild interstitial edema.  Calcification of the right breast implant capsule is noted. No  pneumothorax or pleural effusion.       Impression:      External pacing leads in place with pulmonary vascular  engorgement and mild interstitial edema superimposed upon emphysematous  pulmonary parenchymal change.     This report was finalized on 10/12/2022 7:22 PM by Dr. Mickey Norton M.D.             EKG:   Complete heart block with paced rhythm      Baseline:     I personally viewed and interpreted the patient's EKG/Telemetry tracings.    Assessment:   Assessment & Plan         Takotsubo cardiomyopathy    Heart block AV complete (HCC)    LBBB (left bundle branch block)    Nonrheumatic mitral valve regurgitation        Plan:     She has complete heart block with low ejection fraction.      Will proceed with placement of CRT-P pacing system.      I feel that a defibrillator system is inappropriate given age and comorbidity.   I also feel that there is a high possibility her ejection fraction will recover with biventricular pacing.     Discussed risk on infection, bleeding, pulmonary injury    Patient expressed understanding    Thank you for allowing me to participate in the care of Latoya Iqbal. Feel free to contact me directly with any further questions or concerns.    Tio Interiano MD  10/13/22  17:38 EDT

## 2022-10-13 NOTE — PROGRESS NOTES
Clinical Pharmacy Services: Medication History    Latoya Iqbal is a 82 y.o. female presenting to [facility] for STEMI (ST elevation myocardial infarction) (HCC) [I21.3]  STEMI (ST elevation myocardial infarction) (HCC) [I21.3]    She  has a past medical history of Cancer (HCC), CHF (congestive heart failure) (HCC), COPD (chronic obstructive pulmonary disease) ([facility]), Elevated cholesterol, GERD (gastroesophageal reflux disease), and Hypertension.    Allergies as of 10/12/2022 - Reviewed 10/12/2022   Allergen Reaction Noted    Cortisone Seizure 10/12/2022    Iodine Other (See Comments) 10/12/2022    Penicillins Unknown - Low Severity 10/12/2022    Procaine Seizure 10/12/2022       Medication information was obtained from: List faxed from [facility]  Pharmacy and Phone Number:     Prior to Admission Medications       Prescriptions Last Dose Informant Patient Reported? Taking?    acetaminophen (TYLENOL) 500 MG tablet   Yes Yes    Take 1 tablet by mouth 4 (Four) Times a Day As Needed for Mild Pain.    albuterol sulfate  (90 Base) MCG/ACT inhaler   Yes Yes    Inhale 2 puffs Every 4 (Four) Hours As Needed for Wheezing or Shortness of Air.    alendronate (FOSAMAX) 70 MG tablet   Yes Yes    Take 1 tablet by mouth Every 7 (Seven) Days.    amLODIPine (NORVASC) 5 MG tablet  Pharmacy Yes Yes    Take 1 tablet by mouth Daily.    COENZYME Q10 PO  Pharmacy Yes Yes    Take 10 mg by mouth Daily.    ferrous sulfate 325 (65 FE) MG tablet  Pharmacy Yes Yes    Take 1 tablet by mouth 2 (Two) Times a Day With Meals.    fluticasone (FLONASE) 50 MCG/ACT nasal spray  Pharmacy Yes Yes    2 sprays into the nostril(s) as directed by provider Daily.    lidocaine (LIDODERM) 5 %   Yes Yes    Place 1 patch on the skin as directed by provider Daily As Needed for Mild Pain or Moderate Pain. Remove & Discard patch within 12 hours or as directed by MD    loratadine (CLARITIN) 10 MG tablet   Yes Yes    Take 1 tablet by  mouth Daily As Needed for Allergies.    meclizine (ANTIVERT) 25 MG tablet   Yes Yes    Take 1 tablet by mouth 2 (Two) Times a Day As Needed for Dizziness.    omeprazole (priLOSEC) 20 MG capsule  Pharmacy Yes Yes    Take 1 capsule by mouth Daily.    rosuvastatin (CRESTOR) 20 MG tablet  Pharmacy Yes Yes    Take 1 tablet by mouth Daily.    tiZANidine (ZANAFLEX) 4 MG tablet   Yes Yes    Take 1 tablet by mouth Daily As Needed for Muscle Spasms.    valACYclovir (VALTREX) 500 MG tablet  Pharmacy Yes Yes    Take 1 tablet by mouth Daily.    Fluticasone-Salmeterol (ADVAIR/WIXELA) 500-50 MCG/ACT DISKUS   Yes No    Inhale 1 puff 2 (Two) Times a Day.          Medication notes:     This medication list is complete to the best of my knowledge as of 10/13/2022    Please call if questions.    Sheri Connolly, PharmD  10/13/2022 12:29 EDT

## 2022-10-13 NOTE — CONSULTS
Patient Care Team:  Radu Fisher PA as PCP - General (Family Medicine)      Subjective     Patient is a 82 y.o. female.  Called by Dr. Pollard to see in consultation for some respiratory distress.  This lady presented with near syncope and shortness of breath, chest discomfort and she has a history of COPD and hypertension.  Symptoms were rather acute, onset around 2 PM today in the emergency department she was noted to have a left bundle branch block and complete heart block.  She was found to have a ST elevation myocardial infarction.  She had a temporary pacemaker placed.  She had: Coronary angiography as well.  She had very minimal nonobstructive disease in the LAD.  Dr. Pollard feels that heart block may have precipitated Takotsubo type cardiomyopathy.  SHe also has severe mitral insufficiency  She says she is free of any chest pain or arm pain.  No nausea.  She is short of breath.  Currently she has a little bit of cough but she thinks most of that is postnasal drainage.  She does have some low back pain which is chronic.  She says she has 3 crushed vertebrae and whenever she has to lie flat.  She has significant pain.  Patient reports seizure-like activity to a combination of cortisone and procaine.  Sounds like she was getting a joint injection.    Review of Systems:  No headache or acute visual changes no difficulty swallowing no bad heartburn indigestion no melena hematochezia no history of liver disease or hepatitis.  No yellow jaundice.  No dysuria hematuria no urgency or frequency no polyuria polydipsia heat or cold intolerances.  No easy bleeding, bruising or blood clotting problems.  No new skin rash.  She does have a history of some skin cancers that are been removed and she had left breast cancer.  She has bilateral breast prostheses but something happened to the right prostheses and it collapsed.  She does have COPD does not get too short of breath.  She has some as needed  "albuterol for use only.  She stopped smoking a number of years ago does not drink or use illicit drugs.  She still works.  She is not really been under any undue stress or anxiety recently.      History  Past Medical History:   Diagnosis Date   • CHF (congestive heart failure) ([facility])    • COPD (chronic obstructive pulmonary disease) ([facility])      Past Surgical History:   Procedure Laterality Date   • INSERTION OF TISSUE EXPANDER AFTER MASTECTOMY Left     1996   • REPLACEMENT TOTAL HIP LATERAL POSITION Left     1978   • TONSILECTOMY, ADENOIDECTOMY, BILATERAL MYRINGOTOMY AND TUBES       Social History     Socioeconomic History   • Marital status:      History reviewed. No pertinent family history.      Allergies:  Cortisone, Iodine, Penicillins, and Procaine    Medications:  Prior to Admission medications    Not on File     furosemide, 20 mg, Intravenous, Once  [MAR Hold] Morphine, 2 mg, Intravenous, Once      DOPamine, 2-20 mcg/kg/min, Last Rate: Stopped (10/12/22 1953)        Objective     Vital Signs  Vital Sign Min/Max for last 24 hours  Temp  Min: 97.7 °F (36.5 °C)  Max: 97.7 °F (36.5 °C)   BP  Min: 99/50  Max: 128/65   Pulse  Min: 43  Max: 80   Resp  Min: 12  Max: 24   SpO2  Min: 78 %  Max: 92 %   Flow (L/min)  Min: 3  Max: 15   Weight  Min: 55.8 kg (123 lb)  Max: 55.8 kg (123 lb)       Intake/Output Summary (Last 24 hours) at 10/12/2022 2105  Last data filed at 10/12/2022 2019  Gross per 24 hour   Intake 1300 ml   Output --   Net 1300 ml     I/O this shift:  In: 1300 [I.V.:1300]  Out: -   Last Weight and Admission Weight        10/12/22  1827   Weight: 55.8 kg (123 lb)     Flowsheet Rows    Flowsheet Row First Filed Value   Admission Height 167.6 cm (66\") Documented at 10/12/2022 1827   Admission Weight 55.8 kg (123 lb) Documented at 10/12/2022 1827          Body mass index is 19.85 kg/m².           Physical Exam:  General Appearance: Well-developed white female she is resting in bed she is having little " trouble getting comfortable.  She has to lay flat with her catheters in place and having some back pain again.  Its lower back pain hits in the lumbar region  Eyes: Conjunctiva are clear and anicteric, pupils are equal  ENT: Mucous membranes are moist no erythema or exudates, nasal septum midline  Neck: No adenopathy or thyromegaly no visible jugular venous tension, trachea midline  Lungs: She has a few rales in the bases bilaterally.  No wheezes.  Symmetric nonlabored chest expansion  Cardiac: Regular rate rhythm.  It is paced in the 60s is 100% paced.  Abdomen: Soft nontender no palpable hepatosplenomegaly or masses  : Not examined  Musculoskeletal: She clearly has marked asymmetry of her breast clearly left prosthesis.  She has right groin A-line and venous sheaths in place with transvenous pacer.  There is no bleeding, no masses at the site.  No tenderness.  She does have tenderness in the lumbar spine to pressure and he actually can tell that she is got some abnormalities of her vertebrae  Skin: Warm and dry no jaundice no petechiae  Neuro: Alert and oriented cooperative following commands grossly intact  Extremities/P Vascular: No clubbing no cyanosis no edema palpable radial and dorsalis pedis pulses bilaterally.  MSE: She is a very pleasant lady, very appropriate      Labs:  Results from last 7 days   Lab Units 10/12/22  1835   GLUCOSE mg/dL 223*   SODIUM mmol/L 139   POTASSIUM mmol/L 5.1   CO2 mmol/L 16.8*   CHLORIDE mmol/L 103   ANION GAP mmol/L 19.2*   CREATININE mg/dL 1.25*   BUN mg/dL 26*   BUN / CREAT RATIO  20.8   CALCIUM mg/dL 10.3   ALK PHOS U/L 80   TOTAL PROTEIN g/dL 7.3   ALT (SGPT) U/L 10   AST (SGOT) U/L 35*   BILIRUBIN mg/dL 0.6   ALBUMIN g/dL 4.60   GLOBULIN gm/dL 2.7     Estimated Creatinine Clearance: 31.1 mL/min (A) (by C-G formula based on SCr of 1.25 mg/dL (H)).      Results from last 7 days   Lab Units 10/12/22  1950 10/12/22  1835   WBC 10*3/mm3  --  16.64*   RBC 10*6/mm3  --  5.06    HEMOGLOBIN g/dL  --  16.0*   HEMOGLOBIN, POC g/dL 13.6  --    HEMATOCRIT %  --  47.3*   HEMATOCRIT POC % 40  --    MCV fL  --  93.5   MCH pg  --  31.6   MCHC g/dL  --  33.8   RDW %  --  12.2*   RDW-SD fl  --  41.4   MPV fL  --  11.8   PLATELETS 10*3/mm3  --  287   NEUTROPHIL % %  --  78.2*   LYMPHOCYTE % %  --  16.3*   MONOCYTES % %  --  4.0*   EOSINOPHIL % %  --  0.5   BASOPHIL % %  --  0.5   IMM GRAN % %  --  0.5   NEUTROS ABS 10*3/mm3  --  13.01*   LYMPHS ABS 10*3/mm3  --  2.71   MONOS ABS 10*3/mm3  --  0.67   EOS ABS 10*3/mm3  --  0.08   BASOS ABS 10*3/mm3  --  0.08   IMMATURE GRANS (ABS) 10*3/mm3  --  0.09*   NRBC /100 WBC  --  0.0         Results from last 7 days   Lab Units 10/12/22  1835   TROPONIN T ng/mL 0.767*     Results from last 7 days   Lab Units 10/12/22  1835   PROBNP pg/mL 1,242.0                 Microbiology Results (last 10 days)     ** No results found for the last 240 hours. **            Diagnostics:  Cardiac Catheterization/Vascular Study    Result Date: 10/12/2022  CARDIAC CATHETERIZATION REPORT Procedure:Left heart catheterization, insertion of a temporary pacemaker DATE OF PROCEDURE: 10/12/22 PROCEDURE PERFORMED BY: Patel Pollard MD, [facility] INDICATION FOR PROCEDURE: Complete heart block shortness of breath left bundle branch block.  DESCRIPTION OF PROCEDURE: She is emergently brought to the Cath Lab critically ill verbal consent was obtained, access was gained in his right common femoral vein using using a micropuncture technique and ultrasound guidance.  6 [place] short sheath was placed without difficulty and a temporary pacemaker was placed under fluoroscopic guidance with good capture into the right ventricle.  We set the pacer at 80.  We then accessed the right common femoral artery using a micropuncture technique and ultrasound guidance and a safe femoral approach confirming access in the common femoral artery. A 6-Cymraes short sheath was placed without difficulty.  Intraarterial  cocktail was given.  Left ventriculography was performed followed by selective injection of the left and right coronary arteries were performed using a JL-4 and JR-4 diagnostic catheter.  She was a very uncomfortable from a neck and back standpoint but otherwise patient tolerated the procedure well without early complication and EBL was minimal.  At the conclusion, the sheath were sutured in place.  She went to the CCU in critical condition FINDINGS: LEFT VENTRICULOGRAPHY: The LV pressure was 204/30 aortic pressure was 124/50.  This leaves us with a peak gradient of 80 across the LVOT.  There is some calcium on the aortic valve and in the aorta as well as the coronaries.  There is severely reduced global LV function EF is about 25% large area of mid anterior apical inferoapical severe hypokinesis to akinesis seems consistent with a possible Takotsubo cardiomyopathy.  She has very hyperdynamic basilar function of the heart and I think almost a suicide left ventricle.  She also has severe mitral insufficiency CORONARY ANGIOGRAPHY: Left main: Normal Left anterior descending: 10% luminal irregularities proximally 20% mid disease normal distally diagonals are free of obstructive disease Ramus intermedius:Not present Circumflex: Serpiginous vessel really free of significant obstructive disease RCA: Is a dominant vessel.  Normal throughout SUMMARY: I think she went into complete heart block this may have precipitated a Takotsubo cardiomyopathy and then when she came appear was on dopamine and that really contributed to hopefully her outflow tract obstruction although she has some calcium on her aortic valve the pigtail went across pretty easily and will she has severe mitral insufficiency RECOMMENDATIONS: We will go to the CCU with the pacer in place may give her a little bit of diuretic and we will get an echocardiogram on her she is critically ill with a significant outflow tract obstruction as well as severe MR.  Her  prognosis is guarded Patel Pollard MD 10/12/22 20:12 EDT     XR Chest 1 View    Result Date: 10/12/2022  PORTABLE CHEST X-RAY  HISTORY: Chest pain.  TECHNIQUE: Portable chest x-ray is correlated with chest x-ray November 9, 2016.  FINDINGS: There are external pacing pads in place. The cardiomediastinal silhouette is normal. There is emphysematous change in the lungs with pulmonary vascular engorgement and mild interstitial edema. Calcification of the right breast implant capsule is noted. No pneumothorax or pleural effusion.      External pacing leads in place with pulmonary vascular engorgement and mild interstitial edema superimposed upon emphysematous pulmonary parenchymal change.  This report was finalized on 10/12/2022 7:22 PM by Dr. Mickey Norton M.D.      EP/CRM Study    Result Date: 10/12/2022  CARDIAC CATHETERIZATION REPORT Procedure:Left heart catheterization, insertion of a temporary pacemaker DATE OF PROCEDURE: 10/12/22 PROCEDURE PERFORMED BY: Patel Pollard MD, [facility] INDICATION FOR PROCEDURE: Complete heart block shortness of breath left bundle branch block.  DESCRIPTION OF PROCEDURE: She is emergently brought to the Cath Lab critically ill verbal consent was obtained, access was gained in his right common femoral vein using using a micropuncture technique and ultrasound guidance.  6 [place]yan short sheath was placed without difficulty and a temporary pacemaker was placed under fluoroscopic guidance with good capture into the right ventricle.  We set the pacer at 80.  We then accessed the right common femoral artery using a micropuncture technique and ultrasound guidance and a safe femoral approach confirming access in the common femoral artery. A 6-Libyan short sheath was placed without difficulty.  Intraarterial cocktail was given.  Left ventriculography was performed followed by selective injection of the left and right coronary arteries were performed using a JL-4 and JR-4 diagnostic catheter.   She was a very uncomfortable from a neck and back standpoint but otherwise patient tolerated the procedure well without early complication and EBL was minimal.  At the conclusion, the sheath were sutured in place.  She went to the CCU in critical condition FINDINGS: LEFT VENTRICULOGRAPHY: The LV pressure was 204/30 aortic pressure was 124/50.  This leaves us with a peak gradient of 80 across the LVOT.  There is some calcium on the aortic valve and in the aorta as well as the coronaries.  There is severely reduced global LV function EF is about 25% large area of mid anterior apical inferoapical severe hypokinesis to akinesis seems consistent with a possible Takotsubo cardiomyopathy.  She has very hyperdynamic basilar function of the heart and I think almost a suicide left ventricle.  She also has severe mitral insufficiency CORONARY ANGIOGRAPHY: Left main: Normal Left anterior descending: 10% luminal irregularities proximally 20% mid disease normal distally diagonals are free of obstructive disease Ramus intermedius:Not present Circumflex: Serpiginous vessel really free of significant obstructive disease RCA: Is a dominant vessel.  Normal throughout SUMMARY: I think she went into complete heart block this may have precipitated a Takotsubo cardiomyopathy and then when she came appear was on dopamine and that really contributed to hopefully her outflow tract obstruction although she has some calcium on her aortic valve the pigtail went across pretty easily and will she has severe mitral insufficiency RECOMMENDATIONS: We will go to the CCU with the pacer in place may give her a little bit of diuretic and we will get an echocardiogram on her she is critically ill with a significant outflow tract obstruction as well as severe MR.  Her prognosis is guarded Patel Pollard MD 10/12/22 20:12 EDT         Chest x-ray reviewed pulmonary vascular congestion probably some early interstitial edema and there looks like there may be  just trace bilateral pleural effusions.    Assessment & Plan     1. Complete heart block has temporary transvenous pacer in place.  Blood pressure good  2. Acute MI per cardiology  3. Cardiomyopathy, question Takotsubo per cardiology  4. Severe mitral insufficiency  5. Pulmonary vascular congestion and probably mild pulmonary edema secondary to above.  Patient is receiving diuretics.  6. Acute hypoxic respiratory failure patient is requiring 15L to maintain her saturations.  She is working a little hard to breathe.  I think she will probably benefit from some noninvasive ventilation will help reduce some of the work of breathing and may help with diuresis by offloading work of the heart..  7. COPD I do not think she is in acute exacerbation will have as needed bronchodilators available      Joey Adams Jr, MD  10/12/22  21:05 EDT    Time: Critical care time 41 minutes

## 2022-10-14 ENCOUNTER — APPOINTMENT (OUTPATIENT)
Dept: GENERAL RADIOLOGY | Facility: HOSPITAL | Age: 82
End: 2022-10-14

## 2022-10-14 LAB
ANION GAP SERPL CALCULATED.3IONS-SCNC: 12.6 MMOL/L (ref 5–15)
BUN SERPL-MCNC: 35 MG/DL (ref 8–23)
BUN/CREAT SERPL: 32.7 (ref 7–25)
CALCIUM SPEC-SCNC: 8.4 MG/DL (ref 8.6–10.5)
CHLORIDE SERPL-SCNC: 104 MMOL/L (ref 98–107)
CO2 SERPL-SCNC: 19.4 MMOL/L (ref 22–29)
CREAT SERPL-MCNC: 1.07 MG/DL (ref 0.57–1)
DEPRECATED RDW RBC AUTO: 42.8 FL (ref 37–54)
EGFRCR SERPLBLD CKD-EPI 2021: 52.3 ML/MIN/1.73
ERYTHROCYTE [DISTWIDTH] IN BLOOD BY AUTOMATED COUNT: 12.3 % (ref 12.3–15.4)
GLUCOSE SERPL-MCNC: 131 MG/DL (ref 65–99)
HCT VFR BLD AUTO: 40.7 % (ref 34–46.6)
HGB BLD-MCNC: 13.7 G/DL (ref 12–15.9)
MCH RBC QN AUTO: 31.8 PG (ref 26.6–33)
MCHC RBC AUTO-ENTMCNC: 33.7 G/DL (ref 31.5–35.7)
MCV RBC AUTO: 94.4 FL (ref 79–97)
PLATELET # BLD AUTO: 171 10*3/MM3 (ref 140–450)
PMV BLD AUTO: 11.7 FL (ref 6–12)
POTASSIUM SERPL-SCNC: 4.7 MMOL/L (ref 3.5–5.2)
QT INTERVAL: 362 MS
QT INTERVAL: 366 MS
RBC # BLD AUTO: 4.31 10*6/MM3 (ref 3.77–5.28)
SODIUM SERPL-SCNC: 136 MMOL/L (ref 136–145)
TROPONIN T SERPL-MCNC: 0.79 NG/ML (ref 0–0.03)
WBC NRBC COR # BLD: 17.73 10*3/MM3 (ref 3.4–10.8)

## 2022-10-14 PROCEDURE — 80048 BASIC METABOLIC PNL TOTAL CA: CPT | Performed by: INTERNAL MEDICINE

## 2022-10-14 PROCEDURE — 97530 THERAPEUTIC ACTIVITIES: CPT

## 2022-10-14 PROCEDURE — 93010 ELECTROCARDIOGRAM REPORT: CPT | Performed by: INTERNAL MEDICINE

## 2022-10-14 PROCEDURE — 93005 ELECTROCARDIOGRAM TRACING: CPT | Performed by: INTERNAL MEDICINE

## 2022-10-14 PROCEDURE — 94664 DEMO&/EVAL PT USE INHALER: CPT

## 2022-10-14 PROCEDURE — 94799 UNLISTED PULMONARY SVC/PX: CPT

## 2022-10-14 PROCEDURE — 94660 CPAP INITIATION&MGMT: CPT

## 2022-10-14 PROCEDURE — 84484 ASSAY OF TROPONIN QUANT: CPT | Performed by: INTERNAL MEDICINE

## 2022-10-14 PROCEDURE — 97166 OT EVAL MOD COMPLEX 45 MIN: CPT

## 2022-10-14 PROCEDURE — 94761 N-INVAS EAR/PLS OXIMETRY MLT: CPT

## 2022-10-14 PROCEDURE — 94760 N-INVAS EAR/PLS OXIMETRY 1: CPT

## 2022-10-14 PROCEDURE — 71045 X-RAY EXAM CHEST 1 VIEW: CPT

## 2022-10-14 PROCEDURE — 25010000002 MORPHINE PER 10 MG: Performed by: INTERNAL MEDICINE

## 2022-10-14 PROCEDURE — 97162 PT EVAL MOD COMPLEX 30 MIN: CPT

## 2022-10-14 PROCEDURE — 99024 POSTOP FOLLOW-UP VISIT: CPT | Performed by: STUDENT IN AN ORGANIZED HEALTH CARE EDUCATION/TRAINING PROGRAM

## 2022-10-14 PROCEDURE — 85027 COMPLETE CBC AUTOMATED: CPT | Performed by: INTERNAL MEDICINE

## 2022-10-14 RX ORDER — HYDROMORPHONE HYDROCHLORIDE 1 MG/ML
0.5 INJECTION, SOLUTION INTRAMUSCULAR; INTRAVENOUS; SUBCUTANEOUS ONCE
Status: DISCONTINUED | OUTPATIENT
Start: 2022-10-14 | End: 2022-10-18 | Stop reason: HOSPADM

## 2022-10-14 RX ORDER — HYDROXYZINE HYDROCHLORIDE 25 MG/1
25 TABLET, FILM COATED ORAL EVERY 8 HOURS PRN
Status: DISCONTINUED | OUTPATIENT
Start: 2022-10-14 | End: 2022-10-18 | Stop reason: HOSPADM

## 2022-10-14 RX ORDER — TIZANIDINE 4 MG/1
4 TABLET ORAL DAILY PRN
Status: DISCONTINUED | OUTPATIENT
Start: 2022-10-14 | End: 2022-10-18 | Stop reason: HOSPADM

## 2022-10-14 RX ORDER — ARFORMOTEROL TARTRATE 15 UG/2ML
15 SOLUTION RESPIRATORY (INHALATION)
Status: DISCONTINUED | OUTPATIENT
Start: 2022-10-14 | End: 2022-10-18 | Stop reason: HOSPADM

## 2022-10-14 RX ADMIN — Medication 3 ML: at 21:05

## 2022-10-14 RX ADMIN — Medication 3 ML: at 09:30

## 2022-10-14 RX ADMIN — HYDROXYZINE HYDROCHLORIDE 25 MG: 25 TABLET ORAL at 13:26

## 2022-10-14 RX ADMIN — ARFORMOTEROL TARTRATE 15 MCG: 15 SOLUTION RESPIRATORY (INHALATION) at 21:03

## 2022-10-14 RX ADMIN — MORPHINE SULFATE 4 MG: 2 INJECTION, SOLUTION INTRAMUSCULAR; INTRAVENOUS at 09:30

## 2022-10-14 RX ADMIN — IPRATROPIUM BROMIDE AND ALBUTEROL SULFATE 3 ML: .5; 3 SOLUTION RESPIRATORY (INHALATION) at 07:46

## 2022-10-14 NOTE — PROGRESS NOTES
[facility]  [phone]  Dr. Ten Dwyer    Subjective:  LOS: 2    Chief Complaint:  CHF    Only mild discomfort at pacer site. No other complaints.    Objective   Vital Signs past 24hrs    Temp range: Temp (24hrs), Av °F (36.7 °C), Min:97.9 °F (36.6 °C), Max:98 °F (36.7 °C)    BP range: BP: ()/(28-91) 102/65  Pulse range: Heart Rate:  [] 106  Resp rate range: Resp:  [10-20] 16    Device (Oxygen Therapy): humidified;high-flow nasal cannulaFlow (L/min):  [5-15] 9  Oxygen range:SpO2:  [86 %-96 %] 91 %      56 kg (123 lb 7.3 oz); Body mass index is 19.84 kg/m².    Intake/Output Summary (Last 24 hours) at 10/14/2022 0942  Last data filed at 10/14/2022 0900  Gross per 24 hour   Intake 903 ml   Output 476 ml   Net 427 ml       Physical Exam  Constitutional:       Appearance: She is underweight.   Cardiovascular:      Rate and Rhythm: Normal rate and regular rhythm.   Pulmonary:      Effort: Pulmonary effort is normal.      Breath sounds: Decreased breath sounds present.   Chest:      Comments: Left breast implant  Abdominal:      General: Bowel sounds are normal.      Palpations: Abdomen is soft. There is no mass.      Tenderness: There is no abdominal tenderness.   Musculoskeletal:         General: No swelling.   Neurological:      Mental Status: She is alert.        Results Review:    I have reviewed the laboratory and imaging data since the last note by LPC physician.  My annotations are noted in assessment and plan.    Medication Review:  I have reviewed the current MAR.  My annotations are noted in assessment and plan.       Plan   PCCM Problems  Complete heart block with permanent pacer  Acute MI  Cardiomyopathy  Severe mitral insufficiency  Acute pulmonary edema  Acute hypoxia  COPD  Pneumothorax after pacemaker, resolved      Plan of Treatment    She is now with perm pacer. Doing well.    Remains on DuoNebs 4 times daily without exacerbation of COPD.  No need for systemic  steroids at this time. Change to bid Brovana.    Does not appear fluid overloaded at this time.    OK to transfer out per us. Will sign off.    Ten Dwyer MD  10/14/22  09:42 EDT      Part of this note may be an electronic transcription/translation of spoken language to printed text using the Dragon Dictation System.

## 2022-10-14 NOTE — PLAN OF CARE
Goal Outcome Evaluation:  Plan of Care Reviewed With: patient           Outcome Evaluation: Pt presents with chest pain, N/V, work up for heart block, pacemaker placement and heart cath.  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  Pt seen by OT and is able to get OOB, ambulate to bathroom, jaren socks without assist.  Pt with functional B UE (new pacemaker and pt aware of precautions).  Encourage pt to cont up with nsg (RN present) and sit up in chair for meals.  No further skilled acute care OT needs and pt does not feel needed.  Recommend supervision for intial showering and pt reports has someone who can come over.

## 2022-10-14 NOTE — THERAPY EVALUATION
Patient Name: Latoya Iqbal  : 1940    MRN: [id number]                              Today's Date: 10/14/2022       Admit Date: 10/12/2022    Visit Dx:     ICD-10-CM ICD-9-CM   1. Complete heart block (HCC)  I44.2 426.0   2. Acute coronary syndrome (HCC)  I24.9 411.1   3. ST elevation myocardial infarction (STEMI), unspecified artery (HCC)  I21.3 410.90   4. Heart block AV complete (HCC)  I44.2 426.0     Patient Active Problem List   Diagnosis   • Takotsubo cardiomyopathy   • Heart block AV complete (HCC)   • LBBB (left bundle branch block)   • Nonrheumatic mitral valve regurgitation     Past Medical History:   Diagnosis Date   • Cancer ([facility])    • CHF (congestive heart failure) ([facility])    • COPD (chronic obstructive pulmonary disease) ([facility])    • Elevated cholesterol    • GERD (gastroesophageal reflux disease)    • Hypertension      Past Surgical History:   Procedure Laterality Date   • APPENDECTOMY         • CARDIAC CATHETERIZATION     • CARDIAC CATHETERIZATION N/A 10/12/2022    Procedure: Left Heart Cath;  Surgeon: Patel Pollard MD;  Location: [facility] CATH INVASIVE LOCATION;  Service: Cardiovascular;  Laterality: N/A;   • CARDIAC CATHETERIZATION N/A 10/12/2022    Procedure: Left ventriculography;  Surgeon: Patel Pollard MD;  Location: [facility] CATH INVASIVE LOCATION;  Service: Cardiovascular;  Laterality: N/A;   • CARDIAC ELECTROPHYSIOLOGY PROCEDURE N/A 10/12/2022    Procedure: Temporary Pacemaker;  Surgeon: Patel Pollard MD;  Location: [facility] CATH INVASIVE LOCATION;  Service: Cardiovascular;  Laterality: N/A;   • CARDIAC ELECTROPHYSIOLOGY PROCEDURE N/A 10/13/2022    Procedure: BIVENTRICULAR DEVICE INSERTION [facility];  Surgeon: Tio Interiano MD;  Location: [facility] CATH INVASIVE LOCATION;  Service: Cardiology;  Laterality: N/A;   • HYSTERECTOMY         • INSERTION OF TISSUE EXPANDER AFTER MASTECTOMY Left        • MASTECTOMY         • REPLACEMENT TOTAL HIP LATERAL POSITION Left      1978   • SKIN BIOPSY      multple over years   • TONSILECTOMY, ADENOIDECTOMY, BILATERAL MYRINGOTOMY AND TUBES      1962      General Information     Row Name 10/14/22 1555          Physical Therapy Time and Intention    Document Type evaluation;therapy note (daily note) (P)   -     Mode of Treatment individual therapy;physical therapy (P)   -[facility] Name 10/14/22 1555          General Information    Patient Profile Reviewed yes (P)   -SM     Prior Level of Function independent:;all household mobility;community mobility;gait;transfer;bed mobility;ADL's (P)   -     Existing Precautions/Restrictions other (see comments);oxygen therapy device and L/min (P)   new pacemaker  -     Barriers to Rehab none identified (P)   -[facility] Name 10/14/22 1555          Living Environment    People in Home alone (P)   -[facility] Name 10/14/22 1555          Home Main Entrance    Number of Stairs, Main Entrance other (see comments) (P)   1 flight  -[facility] Name 10/14/22 1555          Cognition    Orientation Status (Cognition) oriented x 4 (P)   -[facility] Name 10/14/22 1555          Safety Issues, Functional Mobility    Impairments Affecting Function (Mobility) balance;endurance/activity tolerance;strength (P)   -           User Key  (r) = Recorded By, (t) = Taken By, (c) = Cosigned By    Initials Name Provider Type    David Lechuga, PT Student PT Student               Mobility     [facility] Name 10/14/22 1558          Bed Mobility    Bed Mobility supine-sit;sit-supine (P)   -     Supine-Sit [place] (Bed Mobility) standby assist (P)   -     Sit-Supine [place] (Bed Mobility) standby assist (P)   -     Assistive Device (Bed Mobility) bed rails (P)   -[facility] Name 10/14/22 1558          Sit-Stand Transfer    Sit-Stand [place] (Transfers) standby assist (P)   -[facility] Name 10/14/22 1558          Gait/Stairs (Locomotion)    [place] Level (Gait) contact guard;1 person assist (P)   -      Distance in Feet (Gait) 100 (P)   -SM     Deviations/Abnormal Patterns (Gait) antalgic;enma decreased;gait speed decreased;stride length decreased (P)   -SM     Bilateral Gait Deviations heel strike decreased (P)   -SM           User Key  (r) = Recorded By, (t) = Taken By, (c) = Cosigned By    Initials Name Provider Type     David Alan, PT Student PT Student               Obj/Interventions     Row Name 10/14/22 1600          Range of Motion Comprehensive    General Range of Motion bilateral upper extremity ROM WFL;bilateral lower extremity ROM WFL (P)   -[facility] Name 10/14/22 1600          Strength Comprehensive (MMT)    Comment, General Manual Muscle Testing (MMT) Assessment BLE strength grossly >/=4+/5 (P)   -[facility] Name 10/14/22 1600          Motor Skills    Therapeutic Exercise other (see comments) (P)   LAQ, Seated Marching, AP 10 reps BLE  -[facility] Name 10/14/22 1600          Balance    Balance Assessment sitting static balance;sitting dynamic balance;standing static balance;sit to stand dynamic balance;standing dynamic balance (P)   -     Static Sitting Balance standby assist (P)   -SM     Dynamic Sitting Balance standby assist (P)   -     Position, Sitting Balance sitting edge of bed;unsupported (P)   -SM     Sit to Stand Dynamic Balance standby assist (P)   -SM     Static Standing Balance contact guard (P)   -SM     Dynamic Standing Balance contact guard (P)   -SM     Balance Interventions sitting;standing;sit to stand;static;dynamic;minimal challenge (P)   -SM           User Key  (r) = Recorded By, (t) = Taken By, (c) = Cosigned By    Initials Name Provider Type     David Alan, PT Student PT Student               Goals/Plan     Row Name 10/14/22 1613          Bed Mobility Goal 1 (PT)    Activity/Assistive Device (Bed Mobility Goal 1, PT) bed mobility activities, all (P)   -SM     [place] Level/Cues Needed (Bed Mobility Goal 1, PT) independent (P)   -SM     Time Frame (Bed  Mobility Goal 1, PT) 1 week (P)   -SM     Row Name 10/14/22 1613          Transfer Goal 1 (PT)    Activity/Assistive Device (Transfer Goal 1, PT) transfers, all (P)   -SM     [place] Level/Cues Needed (Transfer Goal 1, PT) independent (P)   -SM     Time Frame (Transfer Goal 1, PT) 1 week (P)   -SM     Row Name 10/14/22 1613          Gait Training Goal 1 (PT)    Activity/Assistive Device (Gait Training Goal 1, PT) gait (walking locomotion);improve balance and speed;decrease fall risk (P)   -SM     [place] Level (Gait Training Goal 1, PT) independent (P)   -SM     Distance (Gait Training Goal 1, PT) 200ft (P)   -SM     Time Frame (Gait Training Goal 1, PT) 1 week (P)   -SM     Row Name 10/14/22 1613          Therapy Assessment/Plan (PT)    Planned Therapy Interventions (PT) balance training;bed mobility training;home exercise program;gait training;patient/family education;transfer training;strengthening (P)   -SM           User Key  (r) = Recorded By, (t) = Taken By, (c) = Cosigned By    Initials Name Provider Type    SM David Alan, PT Student PT Student               Clinical Impression     Row Name 10/14/22 1601          Pain    Pre/Posttreatment Pain Comment Pt reports soreness related to new pacemaker. no rating given (P)   -SM     Pain Intervention(s) Repositioned;Ambulation/increased activity (P)   -SM     Row Name 10/14/22 1601          Plan of Care Review    Plan of Care Reviewed With patient;family (P)   -SM     Progress no change (P)   -SM     Outcome Evaluation Pt is a 81 yo F presenting to therapy post STEMI. Pt has Hx of COPD and CHF. Pt was found laying in bed A/O x4 and on 12L/min O2. Pt was able to perform bed mobilty and transfer SBA. Pt was able to ambulate 100 ft CGA. Pt tolerated LE strengthening exercises well. Pt may benefit form skilled PT services focusing on balance, gait, and functional mobility. Pt reported lightheadedness while walking and after standing from EOB. PT  recomending home with assist or OPPT after facility D/C. (P)   -SM     Row Name 10/14/22 1601          Therapy Assessment/Plan (PT)    Patient/Family Therapy Goals Statement (PT) return home PLOF (P)   -SM     Rehab Potential (PT) good, to achieve stated therapy goals (P)   -SM     Criteria for Skilled Interventions Met (PT) yes (P)   -SM     Therapy Frequency (PT) 6 times/wk (P)   -SM     Row Name 10/14/22 1601          Positioning and Restraints    Pre-Treatment Position in bed (P)   -SM     Post Treatment Position bed (P)   -SM     In Bed notified nsg;supine;call light within reach;encouraged to call for assist;with family/caregiver (P)   Alarm not on prior to PT arrival  -           User Key  (r) = Recorded By, (t) = Taken By, (c) = Cosigned By    Initials Name Provider Type    David Lechuga, PT Student PT Student               Outcome Measures     Row Name 10/14/22 1614          How much help from another person do you currently need...    Turning from your back to your side while in flat bed without using bedrails? 4 (P)   -SM     Moving from lying on back to sitting on the side of a flat bed without bedrails? 4 (P)   -SM     Moving to and from a bed to a chair (including a wheelchair)? 3 (P)   -SM     Standing up from a chair using your arms (e.g., wheelchair, bedside chair)? 3 (P)   -SM     Climbing 3-5 steps with a railing? 3 (P)   -SM     To walk in hospital room? 3 (P)   -SM     AM-PAC 6 Clicks Score (PT) 20 (P)   -     Highest level of mobility 6 --> Walked 10 steps or more (P)   -     Row Name 10/14/22 1614 10/14/22 1554       Functional Assessment    Outcome Measure Options AM-PAC 6 Clicks Basic Mobility (PT) (P)   -SM AM-PAC 6 Clicks Daily Activity (OT)  -LE          User Key  (r) = Recorded By, (t) = Taken By, (c) = Cosigned By    Initials Name Provider Type    Shameka Amado, OTR Occupational Therapist    David Lechuga, PT Student PT Student                             Physical  Therapy Education     Title: PT OT SLP Therapies (Done)     Topic: Physical Therapy (Done)     Point: Mobility training (Done)     Learning Progress Summary           Patient Acceptance, E, VU,DU by  at 10/14/2022 1614   Family Acceptance, E, VU,DU by  at 10/14/2022 1614                   Point: Home exercise program (Done)     Learning Progress Summary           Patient Acceptance, E, VU,DU by  at 10/14/2022 1614   Family Acceptance, E, VU,DU by  at 10/14/2022 1614                   Point: Body mechanics (Done)     Learning Progress Summary           Patient Acceptance, E, VU,DU by  at 10/14/2022 1614   Family Acceptance, E, VU,DU by  at 10/14/2022 1614                   Point: Precautions (Done)     Learning Progress Summary           Patient Acceptance, E, VU,DU by  at 10/14/2022 1614   Family Acceptance, E, VU,DU by  at 10/14/2022 1614                               User Key     Initials Effective Dates Name Provider Type Discipline     08/15/22 -  David Alan, PT Student PT Student PT              PT Recommendation and Plan  Planned Therapy Interventions (PT): (P) balance training, bed mobility training, home exercise program, gait training, patient/family education, transfer training, strengthening  Plan of Care Reviewed With: (P) patient, family  Progress: (P) no change  Outcome Evaluation: (P) Pt is a 81 yo F presenting to therapy post STEMI. Pt has Hx of COPD and CHF. Pt was found laying in bed A/O x4 and on 12L/min O2. Pt was able to perform bed mobilty and transfer SBA. Pt was able to ambulate 100 ft CGA. Pt tolerated LE strengthening exercises well. Pt may benefit form skilled PT services focusing on balance, gait, and functional mobility. Pt reported lightheadedness while walking and after standing from EOB. PT recomending home with assist or OPPT after facility D/C.     Time Calculation:    PT Charges     Row Name 10/14/22 1615             Time Calculation    Start Time 1335 (P)    -SM      Stop Time 1401 (P)   -SM      Time Calculation (min) 26 min (P)   -SM      PT Received On 10/14/22 (P)   -SM      PT - Next Appointment 10/15/22 (P)   -      PT Goal Re-Cert Due Date 10/21/22 (P)   -SM         Time Calculation- PT    Total Timed Code Minutes- PT 21 minute(s) (P)   -SM         Timed Charges    26795 - PT Therapeutic Activity Minutes 21 (P)   -SM         Total Minutes    Timed Charges Total Minutes 21 (P)   -SM       Total Minutes 21 (P)   -SM            User Key  (r) = Recorded By, (t) = Taken By, (c) = Cosigned By    Initials Name Provider Type     David Alan, PT Student PT Student              Therapy Charges for Today     Code Description Service Date Service Provider Modifiers Qty    [id number]  PT EVAL MOD COMPLEXITY 2 10/14/2022 David Alan, PT Student GP 1    [id number]  PT THERAPEUTIC ACT EA 15 MIN 10/14/2022 David Alan, PT Student GP 1          PT G-Codes  Outcome Measure Options: (P) AM-PAC 6 Clicks Basic Mobility (PT)  AM-PAC 6 Clicks Score (PT): (P) 20  AM-PAC 6 Clicks Score (OT): 17    David Alan PT Student  10/14/2022

## 2022-10-14 NOTE — PLAN OF CARE
Goal Outcome Evaluation:  Plan of Care Reviewed With: patient        Progress: improving     Pt remains in CCU.  Chronic back pain and acute left pectoral pain treated with PRN morphine.  On nonrebreather.  Kept NPO for cath lab procedure this afternoon.  Permanent pacemaker placed; TVP removed; arterial sheath remains in right groin (not to be removed until morning).  Site checks; Safeguard dressing on left anterior chest incision.  Special friend updated at bedside post-procedure.

## 2022-10-14 NOTE — THERAPY DISCHARGE NOTE
Acute Care - Occupational Therapy Discharge  [facility]    Patient Name: Latoya Iqbal  : 1940    MRN: [id number]                              Today's Date: 10/14/2022       Admit Date: 10/12/2022    Visit Dx:     ICD-10-CM ICD-9-CM   1. Complete heart block (HCC)  I44.2 426.0   2. Acute coronary syndrome (HCC)  I24.9 411.1   3. ST elevation myocardial infarction (STEMI), unspecified artery (HCC)  I21.3 410.90   4. Heart block AV complete ([facility])  I44.2 426.0     Patient Active Problem List   Diagnosis   • Takotsubo cardiomyopathy   • Heart block AV complete ([facility])   • LBBB (left bundle branch block)   • Nonrheumatic mitral valve regurgitation     Past Medical History:   Diagnosis Date   • Cancer ([facility])    • CHF (congestive heart failure) ([facility])    • COPD (chronic obstructive pulmonary disease) ([facility])    • Elevated cholesterol    • GERD (gastroesophageal reflux disease)    • Hypertension      Past Surgical History:   Procedure Laterality Date   • APPENDECTOMY         • CARDIAC CATHETERIZATION     • CARDIAC CATHETERIZATION N/A 10/12/2022    Procedure: Left Heart Cath;  Surgeon: Patel Pollard MD;  Location: [facility] INVASIVE LOCATION;  Service: Cardiovascular;  Laterality: N/A;   • CARDIAC CATHETERIZATION N/A 10/12/2022    Procedure: Left ventriculography;  Surgeon: Patel Pollard MD;  Location: [facility] CATH INVASIVE LOCATION;  Service: Cardiovascular;  Laterality: N/A;   • CARDIAC ELECTROPHYSIOLOGY PROCEDURE N/A 10/12/2022    Procedure: Temporary Pacemaker;  Surgeon: Patel Pollard MD;  Location: [facility] CATH INVASIVE LOCATION;  Service: Cardiovascular;  Laterality: N/A;   • CARDIAC ELECTROPHYSIOLOGY PROCEDURE N/A 10/13/2022    Procedure: BIVENTRICULAR DEVICE INSERTION [facility];  Surgeon: Tio Interiano MD;  Location: [facility] CATH INVASIVE LOCATION;  Service: Cardiology;  Laterality: N/A;   • HYSTERECTOMY         • INSERTION OF TISSUE EXPANDER AFTER MASTECTOMY Left        • MASTECTOMY       1977   • REPLACEMENT TOTAL HIP LATERAL POSITION Left     1978   • SKIN BIOPSY      multple over years   • TONSILECTOMY, ADENOIDECTOMY, BILATERAL MYRINGOTOMY AND TUBES      1962      General Information     Row Name 10/14/22 1544          OT Time and Intention    Document Type evaluation;therapy note (daily note)  -LE     Mode of Treatment occupational therapy;individual therapy  -     Row Name 10/14/22 1544          General Information    Patient Profile Reviewed yes  -LE     Prior Level of Function independent:;transfer;ADL's  -LE     Existing Precautions/Restrictions --  new pacemaker.  -     Row Name 10/14/22 1544          Living Environment    People in Home alone  has support of S.O. and adoptive daughter.  -     Row Name 10/14/22 1544          Cognition    Orientation Status (Cognition) oriented x 4  -[facility] Name 10/14/22 1544          Safety Issues, Functional Mobility    Comment, Safety Issues/Impairments (Mobility) non skid socks and gait belt.  -LE           User Key  (r) = Recorded By, (t) = Taken By, (c) = Cosigned By    Initials Name Provider Type    Shameka Amado OTR Occupational Therapist               Mobility/ADL's     Row Name 10/14/22 1545          Bed Mobility    Bed Mobility supine-sit;sit-supine  -LE     Supine-Sit [place] (Bed Mobility) independent  -LE     Sit-Supine [place] (Bed Mobility) independent  -     Row Name 10/14/22 1545          Transfers    Transfers stand-sit transfer;sit-stand transfer;toilet transfer  -LE     Comment, (Transfers) did not want to sit up in chair due to back problems  -     Row Name 10/14/22 1545          Sit-Stand Transfer    Sit-Stand [place] (Transfers) independent  -LE     Row Name 10/14/22 1545          Stand-Sit Transfer    Stand-Sit [place] (Transfers) independent  -[facility] Name 10/14/22 1545          Toilet Transfer    Type (Toilet Transfer) stand pivot/stand step  -LE     [place] Level (Toilet Transfer)  set up  -LE     Row Name 10/14/22 1545          Functional Mobility    Functional Mobility- Ind. Level standby assist;set up required  OT holds catheter.  -[facility] Name 10/14/22 1545          Activities of Daily Living    BADL Assessment/Intervention toileting;feeding;grooming;lower body dressing  -[facility] Name 10/14/22 1545          Toileting Assessment/Training    Comment, (Toileting) currently with catheter.  RN plans to d/c.  do not anticipate pt having trouble with toileting.  -     Row Name 10/14/22 1545          Self-Feeding Assessment/Training    Comment, (Feeding) denies difficulty.  -     Row Name 10/14/22 1545          Lower Body Dressing Assessment/Training    [place] Level (Lower Body Dressing) don;socks;set up  -LE     Position (Lower Body Dressing) edge of bed sitting  -LE           User Key  (r) = Recorded By, (t) = Taken By, (c) = Cosigned By    Initials Name Provider Type    Shameka Amado, OTR Occupational Therapist               Obj/Interventions     Row Name 10/14/22 1552          Sensory Assessment (Somatosensory)    Sensory Assessment (Somatosensory) unable/difficult to assess  -[facility] Name 10/14/22 1552          Range of Motion Comprehensive    Comment, General Range of Motion recent pacemaker L UE, otherwise functional ROM  -[facility] Name 10/14/22 1552          Balance    Comment, Balance no LOB or unsteadiness.  -LE           User Key  (r) = Recorded By, (t) = Taken By, (c) = Cosigned By    Initials Name Provider Type    Shameka Amado, YUN Occupational Therapist               Goals/Plan     Row Name 10/14/22 1553          Transfer Goal 1 (OT)    Activity/Assistive Device (Transfer Goal 1, OT) sit-to-stand/stand-to-sit;bed-to-chair/chair-to-bed;toilet  -LE     [place] Level/Cues Needed (Transfer Goal 1, OT) set-up required  -LE     Time Frame (Transfer Goal 1, OT) 1 day  -LE     Progress/Outcome (Transfer Goal 1, OT) goal met  -LE           User Key  (r) =  "Recorded By, (t) = Taken By, (c) = Cosigned By    Initials Name Provider Type    Shameka Amado, OTR Occupational Therapist               Clinical Impression     Row Name 10/14/22 1537          Pain Assessment    Pre/Posttreatment Pain Comment \"sore\" L UE from recent pacemaker.  pt is aware of pacemaker precuations.  -JARED     Row Name 10/14/22 1537          Plan of Care Review    Plan of Care Reviewed With patient  -LE     Outcome Evaluation Pt presents with chest pain, N/V, work up for heart block, pacemaker placement and heart cath.  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  Pt seen by OT and is able to get OOB, ambulate to bathroom, jaren socks without assist.  Pt with functional B UE (new pacemaker and pt aware of precautions).  Encourage pt to cont up with nsg (RN present) and sit up in chair for meals.  No further skilled acute care OT needs and pt does not feel needed.  Recommend supervision for intial showering and pt reports has someone who can come over.  -     Row Name 10/14/22 1537          Therapy Assessment/Plan (OT)    Therapy Frequency (OT) evaluation only  -     Row Name 10/14/22 1537          Therapy Plan Review/Discharge Plan (OT)    Anticipated Discharge Disposition (OT) home with assist  -[facility] Name 10/14/22 1537          Vital Signs    O2 Delivery Pre Treatment room air  -LE     O2 Delivery Intra Treatment room air  -LE     O2 Delivery Post Treatment room air  -LE     Pre Patient Position Side Lying  -LE     Intra Patient Position Standing  -LE     Post Patient Position Side Lying  -LE     Row Name 10/14/22 1537          Positioning and Restraints    Pre-Treatment Position in bed  -LE     Post Treatment Position bed  -LE     In Bed notified nsg;side lying right;call light within reach;encouraged to call for assist;exit alarm on  -LE           User Key  (r) = Recorded By, (t) = Taken By, (c) = Cosigned By    Initials Name Provider Type    Shameka Amado OTR " Occupational Therapist               Outcome Measures     Row Name 10/14/22 1554          How much help from another is currently needed...    Putting on and taking off regular lower body clothing? 3  -LE     Bathing (including washing, rinsing, and drying) 3  -LE     Toileting (which includes using toilet bed pan or urinal) 1  -LE     Putting on and taking off regular upper body clothing 3  -LE     Taking care of personal grooming (such as brushing teeth) 3  -LE     Eating meals 4  -LE     AM-PAC 6 Clicks Score (OT) 17  -LE     Row Name 10/14/22 1554          Functional Assessment    Outcome Measure Options AM-PAC 6 Clicks Daily Activity (OT)  -LE           User Key  (r) = Recorded By, (t) = Taken By, (c) = Cosigned By    Initials Name Provider Type    LE Shameka Roger OTR Occupational Therapist              Occupational Therapy Education     Title: PT OT SLP Therapies (Done)     Topic: Occupational Therapy (Done)     Point: ADL training (Done)     Description:   Instruct learner(s) on proper safety adaptation and remediation techniques during self care or transfers.   Instruct in proper use of assistive devices.              Learning Progress Summary           Patient Acceptance, E, Bed IU by JARED at 10/14/2022 9586    Comment: role of OT, plan of care, call for asssit due to lines, SBA for showering once home due to statistical risk of falls in BR at home                               User Key     Initials Effective Dates Name Provider Type Discipline    JARED 06/16/21 -  Shameka Roger OTR Occupational Therapist OT              OT Recommendation and Plan  Therapy Frequency (OT): evaluation only  Plan of Care Review  Plan of Care Reviewed With: patient  Outcome Evaluation: Pt presents with chest pain, N/V, work up for heart block, pacemaker placement and heart cath.  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  Pt seen by OT and is able to get OOB, ambulate to bathroom, jaren socks without  assist.  Pt with functional B UE (new pacemaker and pt aware of precautions).  Encourage pt to cont up with nsg (RN present) and sit up in chair for meals.  No further skilled acute care OT needs and pt does not feel needed.  Recommend supervision for intial showering and pt reports has someone who can come over.  Plan of Care Reviewed With: patient  Outcome Evaluation: Pt presents with chest pain, N/V, work up for heart block, pacemaker placement and heart cath.  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  Pt seen by OT and is able to get OOB, ambulate to bathroom, jaren socks without assist.  Pt with functional B UE (new pacemaker and pt aware of precautions).  Encourage pt to cont up with nsg (RN present) and sit up in chair for meals.  No further skilled acute care OT needs and pt does not feel needed.  Recommend supervision for intial showering and pt reports has someone who can come over.     Time Calculation:    Time Calculation- OT     Row Name 10/14/22 1555             Time Calculation- OT    OT Start Time 1515  -LE      OT Stop Time 1531  -LE      OT Time Calculation (min) 16 min  -LE      Total Timed Code Minutes- OT 8 minute(s)  -LE      OT Received On 10/14/22  -LE         Timed Charges    38376 - OT Therapeutic Activity Minutes 8  -LE         Untimed Charges    OT Eval/Re-eval Minutes 8  -LE         Total Minutes    Timed Charges Total Minutes 8  -LE      Untimed Charges Total Minutes 8  -LE       Total Minutes 16  -LE            User Key  (r) = Recorded By, (t) = Taken By, (c) = Cosigned By    Initials Name Provider Type    Shameka Amado OTR Occupational Therapist              Therapy Charges for Today     Code Description Service Date Service Provider Modifiers Qty    31628174845 HC OT EVAL MOD COMPLEXITY 2 10/14/2022 Shameka Roger OTR GO 1    58298124043 HC OT THERAPEUTIC ACT EA 15 MIN 10/14/2022 Shameka Roger OTR GO 1             OT Discharge Summary  Anticipated  Discharge Disposition (OT): home  Reason for Discharge: All goals achieved, At baseline function  Discharge Destination: Home    Shameka Roger, OTR  10/14/2022

## 2022-10-14 NOTE — PLAN OF CARE
Goal Outcome Evaluation:  Plan of Care Reviewed With: patient        Progress: improving     Pt remains in CCU.  Pacemaker site dry and approximated; interrogated this morning.  Arterial sheath removed at 0957; lying flat caused pt severe discomfort due to her chronic back pain.  On humidified high-flow NC.  VSS.  Very little oral intake.  Moody remains in place.  Pt got out of bed with both PT and OT.  Pt and friend updated at bedside.

## 2022-10-14 NOTE — PROGRESS NOTES
Patient status post CRT-P implantation. Normal device testing and function this AM. Art line pulled this AM without any difficulty. Agree with initiating BB. Okay for transfer out of CCU today. EP will follow along peripherally for any device needs or concerns.

## 2022-10-14 NOTE — PROGRESS NOTES
"    Patient Name: Latoya Iqbal  :1940  82 y.o.      Patient Care Team:  Laure Burrows MD as PCP - General (Internal Medicine)    Chief Complaint:   Shortness of breath    Interval History:   Status post CRT-P 10/13/2022 with Dr. Interiano, currently feels well.    Objective   Vital Signs  Temp:  [97.9 °F (36.6 °C)-98 °F (36.7 °C)] 98 °F (36.7 °C)  Heart Rate:  [] 107  Resp:  [10-20] 16  BP: ()/(28-91) 113/60  Arterial Line BP: ()/(49-63) 100/54    Intake/Output Summary (Last 24 hours) at 10/14/2022 1111  Last data filed at 10/14/2022 0900  Gross per 24 hour   Intake 903 ml   Output 476 ml   Net 427 ml     Flowsheet Rows    Flowsheet Row First Filed Value   Admission Height 167.6 cm (66\") Documented at 10/12/2022 1827   Admission Weight 55.8 kg (123 lb) Documented at 10/12/2022 1827        GEN: no distress, alert and oriented  HEENT: NACT, EOMI, moist mucous membranes  Lungs: CTAB, no wheezes, rales or rhonchi  CV: normal rate, regular rhythm, normal S1, S2, no murmurs, +2 radial pulses b/l, left chest wall pacemaker  Abdomen: soft, nontender, nondistended, NABS  Extremities: no edema  Skin: no rash, warm, dry  Heme/Lymph: no bruising  Psych: organized thought, normal behavior and affect    Results Review:    Results from last 7 days   Lab Units 10/14/22  0436   SODIUM mmol/L 136   POTASSIUM mmol/L 4.7   CHLORIDE mmol/L 104   CO2 mmol/L 19.4*   BUN mg/dL 35*   CREATININE mg/dL 1.07*   GLUCOSE mg/dL 131*   CALCIUM mg/dL 8.4*     Results from last 7 days   Lab Units 10/14/22  0436 10/13/22  0342 10/12/22  1835   TROPONIN T ng/mL 0.792* 1.020* 0.767*     Results from last 7 days   Lab Units 10/14/22  0436   WBC 10*3/mm3 17.73*   HEMOGLOBIN g/dL 13.7   HEMATOCRIT % 40.7   PLATELETS 10*3/mm3 171                           Medication Review:   arformoterol, 15 mcg, Nebulization, BID - RT  atropine sulfate, , ,   ceFAZolin, 2 g, Intravenous, Once  Morphine, 2 mg, Intravenous, Once  sodium " chloride, 3 mL, Intravenous, Q12H              Assessment & Plan   #Stress cardiomyopathy  #Complete heart block status post CRT-P  #Cardiogenic shock secondary to stress cardiomyopathy  #Severe MR    LV gram with apical ballooning consistent with stress cardiomyopathy.  Cardiogenic shock was likely secondary to LVOT obstruction and severe MR in the setting of stress cardiomyopathy on dopamine and complete heart block. Patient feels improved after CRT-P.  Expect her cardiomyopathy to improve with pacemaker and medical management.  Does not appear overloaded on exam.    - CRT-P interrogated this a.m. which revealed normal functioning pacemaker  - Start metoprolol tartrate 12.5 mg twice daily  - Wean O2 as tolerated  - Stable for transfer out of CCU    Luis Felipe Tam MD  [facility]  10/14/22  11:11 EDT

## 2022-10-14 NOTE — PLAN OF CARE
Goal Outcome Evaluation:  Plan of Care Reviewed With: (P) patient, family        Progress: (P) no change  Outcome Evaluation: (P) Pt is a 81 yo F presenting to therapy post STEMI. Pt has Hx of COPD and CHF. Pt was found laying in bed A/O x4 and on 12L/min O2. Pt was able to perform bed mobilty and transfer SBA. Pt was able to ambulate 100 ft CGA. Pt tolerated LE strengthening exercises well. Pt may benefit form skilled PT services focusing on balance, gait, and functional mobility. Pt reported lightheadedness while walking and after standing from EOB. PT recomending home with assist or OPPT after facility D/C.

## 2022-10-15 PROBLEM — I44.2 COMPLETE HEART BLOCK: Status: ACTIVE | Noted: 2022-10-15

## 2022-10-15 LAB — QT INTERVAL: 432 MS

## 2022-10-15 PROCEDURE — 94761 N-INVAS EAR/PLS OXIMETRY MLT: CPT

## 2022-10-15 PROCEDURE — 94799 UNLISTED PULMONARY SVC/PX: CPT

## 2022-10-15 PROCEDURE — 94664 DEMO&/EVAL PT USE INHALER: CPT

## 2022-10-15 PROCEDURE — 99024 POSTOP FOLLOW-UP VISIT: CPT | Performed by: INTERNAL MEDICINE

## 2022-10-15 RX ADMIN — METOPROLOL TARTRATE 12.5 MG: 25 TABLET, FILM COATED ORAL at 20:19

## 2022-10-15 RX ADMIN — ARFORMOTEROL TARTRATE 15 MCG: 15 SOLUTION RESPIRATORY (INHALATION) at 07:05

## 2022-10-15 RX ADMIN — Medication 3 ML: at 20:22

## 2022-10-15 NOTE — PLAN OF CARE
Goal Outcome Evaluation:  Plan of Care Reviewed With: patient        Progress: improving  Outcome Evaluation: no events over night., pt resting well, able to control back pain with positioning

## 2022-10-15 NOTE — PROGRESS NOTES
[facility] Follow Up    Chief Complaint: Follow up cardiomyopathy, heart block    Interval History: She reports some mild lightheadedness but otherwise no significant discomfort or shortness of breath.  She remains on 5 L nasal cannula.    Objective:     Objective:  Temp:  [97 °F (36.1 °C)-98.4 °F (36.9 °C)] 97 °F (36.1 °C)  Heart Rate:  [] 70  Resp:  [14-18] 14  BP: ()/() 113/76     Intake/Output Summary (Last 24 hours) at 10/15/2022 1415  Last data filed at 10/15/2022 0400  Gross per 24 hour   Intake 900 ml   Output 500 ml   Net 400 ml     Body mass index is 20.48 kg/m².      10/12/22  1827 10/13/22  1140 10/15/22  0600   Weight: 55.8 kg (123 lb) 56 kg (123 lb 7.3 oz) 57.8 kg (127 lb 6.8 oz)     Weight change: 1.8 kg (3 lb 15.5 oz)      Physical Exam:   General : Alert, cooperative, in no acute distress.  Neuro: Alert,cooperative and oriented.  Lungs: CTAB. Normal respiratory effort and rate.  CV: Regular rate and rhythm, normal S1 and S2, no murmurs, gallops or rubs.  ABD: Soft, nontender, nondistended. Positive bowel sounds.  Extr: No edema or cyanosis, moves all extremities.    Lab Review:   Results from last 7 days   Lab Units 10/14/22  0436 10/13/22  0342 10/12/22  1835   SODIUM mmol/L 136 139 139   POTASSIUM mmol/L 4.7 4.7 5.1   CHLORIDE mmol/L 104 107 103   CO2 mmol/L 19.4* 18.1* 16.8*   BUN mg/dL 35* 26* 26*   CREATININE mg/dL 1.07* 1.00 1.25*   GLUCOSE mg/dL 131* 198* 223*   CALCIUM mg/dL 8.4* 8.4* 10.3   AST (SGOT) U/L  --   --  35*   ALT (SGPT) U/L  --   --  10     Results from last 7 days   Lab Units 10/14/22  0436 10/13/22  0342 10/12/22  1835   TROPONIN T ng/mL 0.792* 1.020* 0.767*     Results from last 7 days   Lab Units 10/14/22  0436 10/13/22  0342   WBC 10*3/mm3 17.73* 9.53   HEMOGLOBIN g/dL 13.7 13.8   HEMATOCRIT % 40.7 39.7   PLATELETS 10*3/mm3 171 260                   Invalid input(s): LDLCALC  Results from last 7 days   Lab Units 10/12/22  2923    PROBNP pg/mL 1,242.0         I reviewed the patient's new clinical results.  I personally viewed and interpreted the patient's EKG  Current Medications:   Scheduled Meds:arformoterol, 15 mcg, Nebulization, BID - RT  ceFAZolin, 2 g, Intravenous, Once  HYDROmorphone, 0.5 mg, Intravenous, Once  Morphine, 2 mg, Intravenous, Once  sodium chloride, 3 mL, Intravenous, Q12H      Continuous Infusions:     Allergies:  Allergies   Allergen Reactions   • Cortisone Seizure   • Iodine Other (See Comments)   • Penicillins Unknown - Low Severity   • Procaine Seizure       Assessment/Plan:     1. Takatsubo cardiomyopathy. EF of 30-35%.  Not on guideline directed management yet.  2. Complete heart block.  Now status post CRT-P.   3. Cardiogenic shock.  Resolved.  4. Severe mitral regurgitation.  Felt to be functional.   5. Acute hypoxic respiratory failure.  Remains on 5 liters.   6. Atrial flutter.  Although she is still paced underlying rhythm appears to be now flutter.  She is asymptomatic.  7. Hypertension.  Blood pressure is on the low end of normal.  8. COPD    - Start metoprolol to tartrate at a low dose.  - Continue to wean oxygen as tolerates.  - Hard to tell on the monitor but appears that she is still in underlying atrial flutter although she is paced.  Although she carries a high CHADS-VASc score hesitant to start her on anticoagulation just after she had CRT P device placed.  If she remains in atrial fibrillation/flutter may need to reconsider.  -Transfer to the floor.    Lucy Rosas MD  10/15/22  14:15 EDT

## 2022-10-15 NOTE — PROGRESS NOTES
[facility] INPATIENT PROGRESS NOTE         [facility] CORONARY CARE    10/15/2022      PATIENT IDENTIFICATION:  Name: Latoya Iqbal ADMIT: 10/12/2022   : 1940  PCP: Laure Burrows MD    MRN: [id number] LOS: 3 days   AGE/SEX: 82 y.o. female  ROOM: [facility]                     LOS 3    Reason for visit: CHF with heart block and acute MI      SUBJECTIVE:      No new issues overnight.  He was on 10 L of oxygen last night and they have weaned that down to 5 L as of this morning.  No objection to transfer out of intensive care.  Discussed with nursing staff at bedside.  I am seeing the patient for the first time today.  All patient problems are new to me.      Objective   OBJECTIVE:    Vital Sign Min/Max for last 24 hours  Temp  Min: 97.2 °F (36.2 °C)  Max: 98.4 °F (36.9 °C)   BP  Min: 94/59  Max: 124/68   Pulse  Min: 70  Max: 112   Resp  Min: 14  Max: 18   SpO2  Min: 89 %  Max: 98 %   No data recorded   Weight  Min: 57.8 kg (127 lb 6.8 oz)  Max: 57.8 kg (127 lb 6.8 oz)                         Body mass index is 20.48 kg/m².    Intake/Output Summary (Last 24 hours) at 10/15/2022 1004  Last data filed at 10/15/2022 0400  Gross per 24 hour   Intake 1260 ml   Output 675 ml   Net 585 ml         Exam:  GEN:  No distress, appears stated age  EYES:   PERRL, anicteric sclerae  ENT:    External ears/nose normal, OP clear  NECK:  No adenopathy, midline trachea  LUNGS: Normal chest on inspection, palpation and auscultation  CV:  Normal S1S2, without murmur  ABD:  Nontender, nondistended, no hepatosplenomegaly, +BS  EXT:  No edema.  No cyanosis or clubbing.  No mottling and normal cap refill.    Assessment     Scheduled meds:  arformoterol, 15 mcg, Nebulization, BID - RT  ceFAZolin, 2 g, Intravenous, Once  HYDROmorphone, 0.5 mg, Intravenous, Once  Morphine, 2 mg, Intravenous, Once  sodium chloride, 3 mL, Intravenous, Q12H      IV meds:                         Data Review:  Results from last 7 days   Lab  Units 10/14/22  0436 10/13/22  0342 10/12/22  1835   SODIUM mmol/L 136 139 139   POTASSIUM mmol/L 4.7 4.7 5.1   CHLORIDE mmol/L 104 107 103   CO2 mmol/L 19.4* 18.1* 16.8*   BUN mg/dL 35* 26* 26*   CREATININE mg/dL 1.07* 1.00 1.25*   GLUCOSE mg/dL 131* 198* 223*   CALCIUM mg/dL 8.4* 8.4* 10.3         Estimated Creatinine Clearance: 37.6 mL/min (A) (by C-G formula based on SCr of 1.07 mg/dL (H)).  Results from last 7 days   Lab Units 10/14/22  0436 10/13/22  0342 10/12/22  1950 10/12/22  1835   WBC 10*3/mm3 17.73* 9.53  --  16.64*   HEMOGLOBIN g/dL 13.7 13.8  --  16.0*   HEMOGLOBIN, POC g/dL  --   --  13.6  --    PLATELETS 10*3/mm3 171 260  --  287         Results from last 7 days   Lab Units 10/12/22  1835   ALT (SGPT) U/L 10   AST (SGOT) U/L 35*                 Glucose   Date/Time Value Ref Range Status   10/12/2022 2048 199 (H) 70 - 130 mg/dL Final     Comment:     Meter: [id number] : sveta Leyva RN     Chest x-ray 10/14 reviewed            2D echo 10/12/2022 reviewed showed EF 31 to 35% with grade 1A diastolic dysfunction and right ventricular systolic pressure mildly elevated at 35 to 45 mmHg.              Active Hospital Problems    Diagnosis  POA   • Takotsubo cardiomyopathy [I51.81]  Yes   • Heart block AV complete (HCC) [I44.2]  Yes   • LBBB (left bundle branch block) [I44.7]  Yes   • Nonrheumatic mitral valve regurgitation [I34.0]  Yes      Resolved Hospital Problems   No resolved problems to display.         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  No objection to transfer out of intensive care.  Defer medical management to cardiology.  Continue inhaled medications for obstructive lung disease.  Discussed with nursing  staff        Clement Winkler MD  Pulmonary and Critical Care Medicine  [facility]  10/15/2022    10:04 EDT

## 2022-10-16 LAB
ANION GAP SERPL CALCULATED.3IONS-SCNC: 9.4 MMOL/L (ref 5–15)
BUN SERPL-MCNC: 24 MG/DL (ref 8–23)
BUN/CREAT SERPL: 36.4 (ref 7–25)
CALCIUM SPEC-SCNC: 8.7 MG/DL (ref 8.6–10.5)
CHLORIDE SERPL-SCNC: 104 MMOL/L (ref 98–107)
CO2 SERPL-SCNC: 25.6 MMOL/L (ref 22–29)
CREAT SERPL-MCNC: 0.66 MG/DL (ref 0.57–1)
EGFRCR SERPLBLD CKD-EPI 2021: 88.3 ML/MIN/1.73
GLUCOSE SERPL-MCNC: 89 MG/DL (ref 65–99)
POTASSIUM SERPL-SCNC: 4.1 MMOL/L (ref 3.5–5.2)
SODIUM SERPL-SCNC: 139 MMOL/L (ref 136–145)

## 2022-10-16 PROCEDURE — 25010000002 ENOXAPARIN PER 10 MG: Performed by: INTERNAL MEDICINE

## 2022-10-16 PROCEDURE — 94664 DEMO&/EVAL PT USE INHALER: CPT

## 2022-10-16 PROCEDURE — 94761 N-INVAS EAR/PLS OXIMETRY MLT: CPT

## 2022-10-16 PROCEDURE — 99024 POSTOP FOLLOW-UP VISIT: CPT | Performed by: INTERNAL MEDICINE

## 2022-10-16 PROCEDURE — 80048 BASIC METABOLIC PNL TOTAL CA: CPT | Performed by: INTERNAL MEDICINE

## 2022-10-16 PROCEDURE — 94799 UNLISTED PULMONARY SVC/PX: CPT

## 2022-10-16 RX ORDER — ENOXAPARIN SODIUM 100 MG/ML
40 INJECTION SUBCUTANEOUS DAILY
Status: DISCONTINUED | OUTPATIENT
Start: 2022-10-16 | End: 2022-10-18 | Stop reason: HOSPADM

## 2022-10-16 RX ADMIN — ACETAMINOPHEN 650 MG: 325 TABLET, FILM COATED ORAL at 08:25

## 2022-10-16 RX ADMIN — ARFORMOTEROL TARTRATE 15 MCG: 15 SOLUTION RESPIRATORY (INHALATION) at 19:20

## 2022-10-16 RX ADMIN — METOPROLOL TARTRATE 25 MG: 25 TABLET, FILM COATED ORAL at 21:24

## 2022-10-16 RX ADMIN — Medication 3 ML: at 21:24

## 2022-10-16 RX ADMIN — ENOXAPARIN SODIUM 40 MG: 100 INJECTION SUBCUTANEOUS at 08:20

## 2022-10-16 RX ADMIN — ACETAMINOPHEN 650 MG: 325 TABLET, FILM COATED ORAL at 16:33

## 2022-10-16 RX ADMIN — ARFORMOTEROL TARTRATE 15 MCG: 15 SOLUTION RESPIRATORY (INHALATION) at 08:32

## 2022-10-16 RX ADMIN — Medication 3 ML: at 08:20

## 2022-10-16 RX ADMIN — METOPROLOL TARTRATE 25 MG: 25 TABLET, FILM COATED ORAL at 08:20

## 2022-10-16 NOTE — PROGRESS NOTES
[facility] Follow Up    Chief Complaint: Follow up cardiomyopathy, heart block    Interval History: It appears underlying atrial flutter has resolved.  She is on a lower amount of oxygen today.  She continues to have dyspnea with exertion.  She denies any chest pain.    Objective:     Objective:  Temp:  [97 °F (36.1 °C)-98.5 °F (36.9 °C)] 97.7 °F (36.5 °C)  Heart Rate:  [] 90  Resp:  [14-16] 16  BP: ()/(54-76) 139/75     Intake/Output Summary (Last 24 hours) at 10/16/2022 0754  Last data filed at 10/16/2022 0115  Gross per 24 hour   Intake 0 ml   Output 350 ml   Net -350 ml     Body mass index is 20.48 kg/m².      10/12/22  1827 10/13/22  1140 10/15/22  0600   Weight: 55.8 kg (123 lb) 56 kg (123 lb 7.3 oz) 57.8 kg (127 lb 6.8 oz)     Weight change:       Physical Exam:   General : Alert, cooperative, in no acute distress.  Neuro: Alert,cooperative and oriented.  Lungs: CTAB. Normal respiratory effort and rate.  CV: Regular rate and rhythm, normal S1 and S2, no murmurs, gallops or rubs.  ABD: Soft, nontender, nondistended. Positive bowel sounds.  Extr: No edema or cyanosis, moves all extremities.    Lab Review:   Results from last 7 days   Lab Units 10/14/22  0436 10/13/22  0342 10/12/22  1835   SODIUM mmol/L 136 139 139   POTASSIUM mmol/L 4.7 4.7 5.1   CHLORIDE mmol/L 104 107 103   CO2 mmol/L 19.4* 18.1* 16.8*   BUN mg/dL 35* 26* 26*   CREATININE mg/dL 1.07* 1.00 1.25*   GLUCOSE mg/dL 131* 198* 223*   CALCIUM mg/dL 8.4* 8.4* 10.3   AST (SGOT) U/L  --   --  35*   ALT (SGPT) U/L  --   --  10     Results from last 7 days   Lab Units 10/14/22  0436 10/13/22  0342 10/12/22  1835   TROPONIN T ng/mL 0.792* 1.020* 0.767*     Results from last 7 days   Lab Units 10/14/22  0436 10/13/22  0342   WBC 10*3/mm3 17.73* 9.53   HEMOGLOBIN g/dL 13.7 13.8   HEMATOCRIT % 40.7 39.7   PLATELETS 10*3/mm3 171 260                   Invalid input(s): LDLCALC  Results from last 7 days   Lab Units  10/12/22  1835   PROBNP pg/mL 1,242.0         I reviewed the patient's new clinical results.  I personally viewed and interpreted the patient's EKG  Current Medications:   Scheduled Meds:arformoterol, 15 mcg, Nebulization, BID - RT  ceFAZolin, 2 g, Intravenous, Once  HYDROmorphone, 0.5 mg, Intravenous, Once  metoprolol tartrate, 12.5 mg, Oral, Q12H  Morphine, 2 mg, Intravenous, Once  sodium chloride, 3 mL, Intravenous, Q12H      Continuous Infusions:     Allergies:  Allergies   Allergen Reactions   • Cortisone Seizure   • Iodine Other (See Comments)   • Penicillins Unknown - Low Severity   • Procaine Seizure       Assessment/Plan:     1. Takatsubo cardiomyopathy. EF of 30-35%.  Not on guideline directed management yet.  2. Complete heart block.  Now status post CRT-P.   3. Cardiogenic shock.  Resolved.  4. Severe mitral regurgitation.  Felt to be functional.   5. Acute hypoxic respiratory failure.  Down to 3 L.  She does not really have any evidence of volume overload.  6.  Paroxysmal atrial flutter.    Noted on 10/15.  She appears to be back in sinus underlying her paced rhythm.    7. Hypertension.  Blood pressure is on the low end of normal.  8. COPD.  Pulmonary managing.    - Increase metoprolol dosage.  - Wean oxygen as tolerates.  - Hold off on anticoagulation in light of recent CRT-P placement unless she has evidence of recurrent atrial flutter.  - Await further improvement in her oxygen requirements before discharge home.    Lucy Rosas MD  10/16/22  07:54 EDT

## 2022-10-16 NOTE — PLAN OF CARE
Goal Outcome Evaluation:  Plan of Care Reviewed With: patient        Progress: improving  Outcome Evaluation: Patient rhythm stable. Had questions about beta blocker. Will need education as GDMT is started. Quite stable on feet.

## 2022-10-16 NOTE — PLAN OF CARE
Goal Outcome Evaluation:  Plan of Care Reviewed With: patient        Progress: improving  Outcome Evaluation: Patient c/o headache and back pain today, PRN tylenol given x2, pt states norco will make her nauseous. Remains v.paced on the monitor. Lopressor dose increase and lovenox 40mg start, no full dose anticoagulation per Dr. Rosas. PT did not get to work this patient today, will see in the morning.

## 2022-10-16 NOTE — PROGRESS NOTES
[facility] INPATIENT PROGRESS NOTE         [address]    10/16/2022      PATIENT IDENTIFICATION:  Name: Latoya Iqbal ADMIT: 10/12/2022   : 1940  PCP: Laure Burrows MD    MRN: [id number] LOS: 4 days   AGE/SEX: 82 y.o. female  ROOM: [facility]                     LOS 4    Reason for visit: CHF with heart block and acute MI      SUBJECTIVE:      No new issues overnight.  Oxygen down to 3 L.      Objective   OBJECTIVE:    Vital Sign Min/Max for last 24 hours  Temp  Min: 97 °F (36.1 °C)  Max: 98.5 °F (36.9 °C)   BP  Min: 94/59  Max: 125/63   Pulse  Min: 70  Max: 114   Resp  Min: 14  Max: 16   SpO2  Min: 89 %  Max: 97 %   No data recorded   No data recorded                         Body mass index is 20.48 kg/m².    Intake/Output Summary (Last 24 hours) at 10/16/2022 0718  Last data filed at 10/16/2022 0115  Gross per 24 hour   Intake 0 ml   Output 350 ml   Net -350 ml         Exam:  GEN:  No distress, appears stated age  EYES:   PERRL, anicteric sclerae  ENT:    External ears/nose normal, OP clear  NECK:  No adenopathy, midline trachea  LUNGS: Normal chest on inspection, palpation and auscultation  CV:  Normal S1S2, without murmur  ABD:  Nontender, nondistended, no hepatosplenomegaly, +BS  EXT:  No edema.  No cyanosis or clubbing.  No mottling and normal cap refill.    Assessment     Scheduled meds:  arformoterol, 15 mcg, Nebulization, BID - RT  ceFAZolin, 2 g, Intravenous, Once  HYDROmorphone, 0.5 mg, Intravenous, Once  metoprolol tartrate, 12.5 mg, Oral, Q12H  Morphine, 2 mg, Intravenous, Once  sodium chloride, 3 mL, Intravenous, Q12H      IV meds:                         Data Review:  Results from last 7 days   Lab Units 10/14/22  0436 10/13/22  0342 10/12/22  1835   SODIUM mmol/L 136 139 139   POTASSIUM mmol/L 4.7 4.7 5.1   CHLORIDE mmol/L 104 107 103   CO2 mmol/L 19.4* 18.1* 16.8*   BUN mg/dL 35* 26* 26*   CREATININE mg/dL 1.07* 1.00 1.25*   GLUCOSE mg/dL 131* 198* 223*   CALCIUM  mg/dL 8.4* 8.4* 10.3         Estimated Creatinine Clearance: 37.6 mL/min (A) (by C-G formula based on SCr of 1.07 mg/dL (H)).  Results from last 7 days   Lab Units 10/14/22  0436 10/13/22  0342 10/12/22  1950 10/12/22  1835   WBC 10*3/mm3 17.73* 9.53  --  16.64*   HEMOGLOBIN g/dL 13.7 13.8  --  16.0*   HEMOGLOBIN, POC g/dL  --   --  13.6  --    PLATELETS 10*3/mm3 171 260  --  287         Results from last 7 days   Lab Units 10/12/22  1835   ALT (SGPT) U/L 10   AST (SGOT) U/L 35*                 No results found for: HGBA1C, POCGLU  Chest x-ray 10/14 reviewed            2D echo 10/12/2022 reviewed showed EF 31 to 35% with grade 1A diastolic dysfunction and right ventricular systolic pressure mildly elevated at 35 to 45 mmHg.              Active Hospital Problems    Diagnosis  POA   • **Complete heart block (HCC) [I44.2]  Yes   • Takotsubo cardiomyopathy [I51.81]  Yes   • Heart block AV complete (HCC) [I44.2]  Yes   • LBBB (left bundle branch block) [I44.7]  Yes   • Nonrheumatic mitral valve regurgitation [I34.0]  Yes      Resolved Hospital Problems   No resolved problems to display.         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  Doing well out of intensive care.    Oxygen requirements improving.  Continue inhaled medications for obstructive lung disease.  Following peripherally for pulmonary issues.        Clement Winkler MD  Pulmonary and Critical Care Medicine  [facility], [facility]  10/16/2022    07:18 EDT

## 2022-10-17 PROCEDURE — 94799 UNLISTED PULMONARY SVC/PX: CPT

## 2022-10-17 PROCEDURE — 94664 DEMO&/EVAL PT USE INHALER: CPT

## 2022-10-17 PROCEDURE — 99024 POSTOP FOLLOW-UP VISIT: CPT | Performed by: NURSE PRACTITIONER

## 2022-10-17 PROCEDURE — 25010000002 ENOXAPARIN PER 10 MG: Performed by: INTERNAL MEDICINE

## 2022-10-17 PROCEDURE — 97110 THERAPEUTIC EXERCISES: CPT

## 2022-10-17 RX ORDER — METOPROLOL SUCCINATE 50 MG/1
50 TABLET, EXTENDED RELEASE ORAL
Status: DISCONTINUED | OUTPATIENT
Start: 2022-10-17 | End: 2022-10-18 | Stop reason: HOSPADM

## 2022-10-17 RX ADMIN — ACETAMINOPHEN 650 MG: 325 TABLET, FILM COATED ORAL at 08:29

## 2022-10-17 RX ADMIN — ARFORMOTEROL TARTRATE 15 MCG: 15 SOLUTION RESPIRATORY (INHALATION) at 07:39

## 2022-10-17 RX ADMIN — ARFORMOTEROL TARTRATE 15 MCG: 15 SOLUTION RESPIRATORY (INHALATION) at 19:20

## 2022-10-17 RX ADMIN — HYDROCODONE BITARTRATE AND ACETAMINOPHEN 1 TABLET: 5; 325 TABLET ORAL at 21:14

## 2022-10-17 RX ADMIN — METOPROLOL SUCCINATE 50 MG: 50 TABLET, FILM COATED, EXTENDED RELEASE ORAL at 08:29

## 2022-10-17 RX ADMIN — Medication 3 ML: at 21:14

## 2022-10-17 RX ADMIN — ENOXAPARIN SODIUM 40 MG: 100 INJECTION SUBCUTANEOUS at 08:29

## 2022-10-17 RX ADMIN — Medication 3 ML: at 08:31

## 2022-10-17 NOTE — THERAPY TREATMENT NOTE
Patient Name: Latoya Iqbal  : 1940    MRN: [id number]                              Today's Date: 10/17/2022       Admit Date: 10/12/2022    Visit Dx:     ICD-10-CM ICD-9-CM   1. Complete heart block (HCC)  I44.2 426.0   2. Acute coronary syndrome (HCC)  I24.9 411.1   3. ST elevation myocardial infarction (STEMI), unspecified artery (HCC)  I21.3 410.90   4. Heart block AV complete (HCC)  I44.2 426.0     Patient Active Problem List   Diagnosis   • Takotsubo cardiomyopathy   • Heart block AV complete (HCC)   • LBBB (left bundle branch block)   • Nonrheumatic mitral valve regurgitation   • Complete heart block (HCC)     Past Medical History:   Diagnosis Date   • Cancer (HCC)    • CHF (congestive heart failure) (HCC)    • COPD (chronic obstructive pulmonary disease) (HCC)    • Elevated cholesterol    • GERD (gastroesophageal reflux disease)    • Hypertension      Past Surgical History:   Procedure Laterality Date   • APPENDECTOMY         • CARDIAC CATHETERIZATION     • CARDIAC CATHETERIZATION N/A 10/12/2022    Procedure: Left Heart Cath;  Surgeon: Patel Pollard MD;  Location: [facility] INVASIVE LOCATION;  Service: Cardiovascular;  Laterality: N/A;   • CARDIAC CATHETERIZATION N/A 10/12/2022    Procedure: Left ventriculography;  Surgeon: Patel Pollard MD;  Location: [facility] CATH INVASIVE LOCATION;  Service: Cardiovascular;  Laterality: N/A;   • CARDIAC ELECTROPHYSIOLOGY PROCEDURE N/A 10/12/2022    Procedure: Temporary Pacemaker;  Surgeon: Patel Pollard MD;  Location: [facility] CATH INVASIVE LOCATION;  Service: Cardiovascular;  Laterality: N/A;   • CARDIAC ELECTROPHYSIOLOGY PROCEDURE N/A 10/13/2022    Procedure: BIVENTRICULAR DEVICE INSERTION [facility];  Surgeon: Tio Interiano MD;  Location: [facility] CATH INVASIVE LOCATION;  Service: Cardiology;  Laterality: N/A;   • HYSTERECTOMY         • INSERTION OF TISSUE EXPANDER AFTER MASTECTOMY Left        • MASTECTOMY         • REPLACEMENT TOTAL  HIP LATERAL POSITION Left     1978   • SKIN BIOPSY      multple over years   • TONSILECTOMY, ADENOIDECTOMY, BILATERAL MYRINGOTOMY AND TUBES      1962      General Information     Row Name 10/17/22 1434          Physical Therapy Time and Intention    Document Type therapy note (daily note)  -MS     Mode of Treatment physical therapy  -MS     Row Name 10/17/22 1434          General Information    Existing Precautions/Restrictions fall;oxygen therapy device and L/min  no exit alarm upon PT arrival  -MS     Row Name 10/17/22 1434          Cognition    Orientation Status (Cognition) oriented x 4  -MS     Row Name 10/17/22 1434          Safety Issues, Functional Mobility    Impairments Affecting Function (Mobility) balance;endurance/activity tolerance;strength  -MS     Comment, Safety Issues/Impairments (Mobility) Refused gait belt, nonskid socks donned.  -MS           User Key  (r) = Recorded By, (t) = Taken By, (c) = Cosigned By    Initials Name Provider Type    MS Nina Meyer, PT Physical Therapist               Mobility     Row Name 10/17/22 1434          Bed Mobility    Bed Mobility supine-sit;sit-supine  -MS     Supine-Sit [place] (Bed Mobility) independent  -MS     Sit-Supine [place] (Bed Mobility) independent  -MS     Comment, (Bed Mobility) Ind for sitting balance.  -MS     Row Name 10/17/22 1434          Sit-Stand Transfer    Sit-Stand [place] (Transfers) standby assist  -MS     Row Name 10/17/22 1434          Gait/Stairs (Locomotion)    [place] Level (Gait) standby assist  -MS     Distance in Feet (Gait) 150'  -MS     Deviations/Abnormal Patterns (Gait) antalgic;enma decreased;gait speed decreased;stride length decreased  -MS     Bilateral Gait Deviations forward flexed posture  -MS     Comment, (Gait/Stairs) No overt LOB or veering noted. No safety concerns demo'd.  -MS           User Key  (r) = Recorded By, (t) = Taken By, (c) = Cosigned By    Initials Name Provider Type    MS  "Nina Meyer PT Physical Therapist               Obj/Interventions     Row Name 10/17/22 1435          Balance    Dynamic Sitting Balance independent  -MS     Position, Sitting Balance sitting edge of bed  -MS     Dynamic Standing Balance standby assist  -MS           User Key  (r) = Recorded By, (t) = Taken By, (c) = Cosigned By    Initials Name Provider Type    Nina Huff, PT Physical Therapist               Goals/Plan    No documentation.                Clinical Impression     Row Name 10/17/22 1436          Pain    Pretreatment Pain Rating 0/10 - no pain  -MS     Row Name 10/17/22 1436          Plan of Care Review    Plan of Care Reviewed With patient  -MS     Progress improving  -MS     Outcome Evaluation Patient pleasant and agreeable to PT this afternoon. Ind for bed mobility, SBA for transfers and ambulated SBA around the hospital unit. No safety concerns demo'd though did report she felt \"wobbly\". 2L supplemental O2 donned today- would recommend walking oximetry prior to DC. Will continue to advance as able- encouraged patient to continue to ambulate 3x/day to maintain functional mobility. PT will begin to follow peripherally and follow up 10/19 as appropriate.  -MS     Row Name 10/17/22 1436          Therapy Assessment/Plan (PT)    Therapy Frequency (PT) 3 times/wk  -MS     Row Name 10/17/22 1436          Vital Signs    Pre SpO2 (%) 91  -MS     O2 Delivery Pre Treatment supplemental O2  -MS     O2 Delivery Intra Treatment supplemental O2  -MS     Post SpO2 (%) 93  -MS     O2 Delivery Post Treatment supplemental O2  -MS     Row Name 10/17/22 1436          Positioning and Restraints    Pre-Treatment Position in bed  -MS     Post Treatment Position bed  -MS     In Bed fowlers;call light within reach;encouraged to call for assist  no exit alarm upon PT arrival  -MS           User Key  (r) = Recorded By, (t) = Taken By, (c) = Cosigned By    Initials Name Provider Type    Nina Huff, " PT Physical Therapist               Outcome Measures     Row Name 10/17/22 3379          How much help from another person do you currently need...    Turning from your back to your side while in flat bed without using bedrails? 4  -MS     Moving from lying on back to sitting on the side of a flat bed without bedrails? 4  -MS     Moving to and from a bed to a chair (including a wheelchair)? 3  -MS     Standing up from a chair using your arms (e.g., wheelchair, bedside chair)? 3  -MS     Climbing 3-5 steps with a railing? 3  -MS     To walk in hospital room? 3  -MS     AM-PAC 6 Clicks Score (PT) 20  -MS     Highest level of mobility 6 --> Walked 10 steps or more  -MS           User Key  (r) = Recorded By, (t) = Taken By, (c) = Cosigned By    Initials Name Provider Type    MS MeyerNina, PT Physical Therapist                             Physical Therapy Education     Title: PT OT SLP Therapies (Done)     Topic: Physical Therapy (Done)     Point: Mobility training (Done)     Learning Progress Summary           Patient Acceptance, E, VU by  at 10/14/2022 1653    Acceptance, E, VU,DU by  at 10/14/2022 1614   Family Acceptance, E, VU,DU by  at 10/14/2022 1614                   Point: Home exercise program (Done)     Learning Progress Summary           Patient Acceptance, E, VU by  at 10/14/2022 1653    Acceptance, E, VU,DU by  at 10/14/2022 1614   Family Acceptance, E, VU,DU by  at 10/14/2022 1614                   Point: Body mechanics (Done)     Learning Progress Summary           Patient Acceptance, E, VU by CC at 10/14/2022 1653    Acceptance, E, VU,DU by  at 10/14/2022 1614   Family Acceptance, E, VU,DU by  at 10/14/2022 1614                   Point: Precautions (Done)     Learning Progress Summary           Patient Acceptance, E, VU by CC at 10/14/2022 1653    Acceptance, E, VU,DU by  at 10/14/2022 1614   Family Acceptance, E, VU,DU by  at 10/14/2022 1614                                "User Key     Initials Effective Dates Name Provider Type Discipline    CC 06/16/21 -  Vanessa Jennings RN Registered Nurse Nurse     08/15/22 -  David Alan, ARLENE Student PT Student PT              PT Recommendation and Plan     Plan of Care Reviewed With: patient  Progress: improving  Outcome Evaluation: Patient pleasant and agreeable to PT this afternoon. Ind for bed mobility, SBA for transfers and ambulated SBA around the hospital unit. No safety concerns demo'd though did report she felt \"wobbly\". 2L supplemental O2 donned today- would recommend walking oximetry prior to DC. Will continue to advance as able- encouraged patient to continue to ambulate 3x/day to maintain functional mobility. PT will begin to follow peripherally and follow up 10/19 as appropriate.     Time Calculation:    PT Charges     Row Name 10/17/22 1433             Time Calculation    Start Time 1333  -MS      Stop Time 1348  -MS      Time Calculation (min) 15 min  -MS      PT Received On 10/17/22  -MS      PT - Next Appointment 10/19/22  -MS            User Key  (r) = Recorded By, (t) = Taken By, (c) = Cosigned By    Initials Name Provider Type    Nina Huff, PT Physical Therapist              Therapy Charges for Today     Code Description Service Date Service Provider Modifiers Qty    [id number] HC PT THER PROC EA 15 MIN 10/17/2022 Nina Meyer PT GP 1          PT G-Codes  Outcome Measure Options: AM-PAC 6 Clicks Basic Mobility (PT)  AM-PAC 6 Clicks Score (PT): 20  AM-PAC 6 Clicks Score (OT): 17    Nina Meyer PT  10/17/2022    "

## 2022-10-17 NOTE — PROGRESS NOTES
LOS: 5 days   Patient Care Team:  Laure Burrows MD as PCP - General (Internal Medicine)      Chief Complaint: Follow-up cardiogenic shock, complete heart block, mitral regurgitation       Interval History: Feels well overall.  Does report pain in her left shoulder blade.      Objective   Vital Signs  Temp:  [97.5 °F (36.4 °C)-98.1 °F (36.7 °C)] 98.1 °F (36.7 °C)  Heart Rate:  [72-90] 82  Resp:  [16-17] 16  BP: (118-154)/(72-90) 152/90    Intake/Output Summary (Last 24 hours) at 10/17/2022 0912  Last data filed at 10/17/2022 0546  Gross per 24 hour   Intake 0 ml   Output --   Net 0 ml         Physical Exam:  Constitutional: Well appearing, well developed, no acute distress   HENT: Oropharynx clear and membrane moist  Eyes: Normal conjunctiva, no sclera icterus.  Neck: Supple, no carotid bruit bilaterally.  Cardiovascular: Regular rate and rhythm, Holosystolic murmur at the apex, No bilateral lower extremity edema.  Pulmonary: Normal respiratory effort, normal lung sounds, no wheezing.  Abdominal: Soft, nontender, no hepatosplenomegaly, liver is non-pulsatile.  Neurological: Alert and orient x 3.   Skin: Warm, dry, no ecchymosis, no rash.  Psych: Appropriate mood and affect. Normal judgment and insight.      Results Review:      Results from last 7 days   Lab Units 10/16/22  0842 10/14/22  0436 10/13/22  0342   SODIUM mmol/L 139 136 139   POTASSIUM mmol/L 4.1 4.7 4.7   CHLORIDE mmol/L 104 104 107   CO2 mmol/L 25.6 19.4* 18.1*   BUN mg/dL 24* 35* 26*   CREATININE mg/dL 0.66 1.07* 1.00   GLUCOSE mg/dL 89 131* 198*   CALCIUM mg/dL 8.7 8.4* 8.4*     Results from last 7 days   Lab Units 10/14/22  0436 10/13/22  0342 10/12/22  1835   TROPONIN T ng/mL 0.792* 1.020* 0.767*     Results from last 7 days   Lab Units 10/14/22  0436 10/13/22  0342 10/12/22  1950 10/12/22  1835   WBC 10*3/mm3 17.73* 9.53  --  16.64*   HEMOGLOBIN g/dL 13.7 13.8  --  16.0*   HEMOGLOBIN, POC g/dL  --   --  13.6  --    HEMATOCRIT % 40.7 39.7   --  47.3*   HEMATOCRIT POC %  --   --  40  --    PLATELETS 10*3/mm3 171 260  --  287                       I reviewed the patient's new clinical results.  I personally viewed and interpreted the patient's EKG/Telemetry data        Medication Review:   arformoterol, 15 mcg, Nebulization, BID - RT  ceFAZolin, 2 g, Intravenous, Once  enoxaparin, 40 mg, Subcutaneous, Daily  HYDROmorphone, 0.5 mg, Intravenous, Once  metoprolol succinate XL, 50 mg, Oral, Q24H  Morphine, 2 mg, Intravenous, Once  sodium chloride, 3 mL, Intravenous, Q12H             Assessment & Plan       Complete heart block (HCC)    Takotsubo cardiomyopathy    Heart block AV complete (HCC)    LBBB (left bundle branch block)    Nonrheumatic mitral valve regurgitation      1.  Takotsubo cardiomyopathy.  EF 30 to 35%.  Low BP has precluded GDMT.  We will transition to Toprol today.  2.  Complete heart block.  Status post CRT-P.  3.  Severe mitral regurgitation.  Felt to be functional.  4.  Cardiogenic shock.  Secondary to #2 and #3.  Resolved.  5.  Paroxysmal atrial flutter.  Anticoagulation deferred in light of recent CRT-P.  If she has evidence of recurrence, this will need to be reconsidered  6.  Acute hypoxic respiratory failure.  Improving.  7.  Hypertension.  8.  COPD.  Per pulmonary.    -If she tolerates the switch to Toprol and her oxygen continues to improve, I think we can consider discharge tomorrow.      Marcelina Patel, NATHANAEL  10/17/22  09:12 EDT

## 2022-10-17 NOTE — PLAN OF CARE
Goal Outcome Evaluation:  Plan of Care Reviewed With: patient        Progress: improving  Outcome Evaluation: No c/o pain or soa. Lopressor changed to Toprol. Ambulated in hallway with PT, remains on 2L NC. Possible discharge tomorrow.

## 2022-10-17 NOTE — PLAN OF CARE
"Goal Outcome Evaluation:  Plan of Care Reviewed With: patient       Progress: improving  Outcome Evaluation: Patient pleasant and agreeable to PT this afternoon. Ind for bed mobility, SBA for transfers and ambulated SBA around the hospital unit. No safety concerns demo'd though did report she felt \"wobbly\". Patient mentioned she has felt \"wobbly\" since her hip surgery. 2L supplemental O2 donned today- would recommend walking oximetry prior to DC. Will continue to advance as able- encouraged patient to continue to ambulate 3x/day to maintain functional mobility. PT will begin to follow peripherally and follow up 10/19 as appropriate.  "

## 2022-10-18 ENCOUNTER — READMISSION MANAGEMENT (OUTPATIENT)
Dept: CALL CENTER | Facility: HOSPITAL | Age: 82
End: 2022-10-18

## 2022-10-18 VITALS
HEART RATE: 76 BPM | HEIGHT: 66 IN | WEIGHT: 127.43 LBS | TEMPERATURE: 97.9 F | OXYGEN SATURATION: 93 % | SYSTOLIC BLOOD PRESSURE: 141 MMHG | DIASTOLIC BLOOD PRESSURE: 82 MMHG | BODY MASS INDEX: 20.48 KG/M2 | RESPIRATION RATE: 20 BRPM

## 2022-10-18 PROCEDURE — 94799 UNLISTED PULMONARY SVC/PX: CPT

## 2022-10-18 PROCEDURE — 94761 N-INVAS EAR/PLS OXIMETRY MLT: CPT

## 2022-10-18 PROCEDURE — 94664 DEMO&/EVAL PT USE INHALER: CPT

## 2022-10-18 PROCEDURE — 25010000002 ENOXAPARIN PER 10 MG: Performed by: INTERNAL MEDICINE

## 2022-10-18 PROCEDURE — 94760 N-INVAS EAR/PLS OXIMETRY 1: CPT

## 2022-10-18 PROCEDURE — 99024 POSTOP FOLLOW-UP VISIT: CPT | Performed by: NURSE PRACTITIONER

## 2022-10-18 RX ORDER — LISINOPRIL 2.5 MG/1
2.5 TABLET ORAL
Status: DISCONTINUED | OUTPATIENT
Start: 2022-10-18 | End: 2022-10-18

## 2022-10-18 RX ORDER — FUROSEMIDE 20 MG/1
20 TABLET ORAL DAILY
Qty: 30 TABLET | Refills: 3 | Status: SHIPPED | OUTPATIENT
Start: 2022-10-18 | End: 2023-01-03

## 2022-10-18 RX ORDER — FUROSEMIDE 20 MG/1
20 TABLET ORAL DAILY
Status: DISCONTINUED | OUTPATIENT
Start: 2022-10-18 | End: 2022-10-18 | Stop reason: HOSPADM

## 2022-10-18 RX ORDER — METOPROLOL SUCCINATE 50 MG/1
50 TABLET, EXTENDED RELEASE ORAL
Qty: 30 TABLET | Refills: 3 | Status: SHIPPED | OUTPATIENT
Start: 2022-10-19 | End: 2023-01-03

## 2022-10-18 RX ADMIN — FUROSEMIDE 20 MG: 20 TABLET ORAL at 11:32

## 2022-10-18 RX ADMIN — METOPROLOL SUCCINATE 50 MG: 50 TABLET, FILM COATED, EXTENDED RELEASE ORAL at 09:03

## 2022-10-18 RX ADMIN — ARFORMOTEROL TARTRATE 15 MCG: 15 SOLUTION RESPIRATORY (INHALATION) at 07:20

## 2022-10-18 RX ADMIN — ENOXAPARIN SODIUM 40 MG: 100 INJECTION SUBCUTANEOUS at 09:03

## 2022-10-18 RX ADMIN — Medication 3 ML: at 09:03

## 2022-10-18 NOTE — PLAN OF CARE
Goal Outcome Evaluation:  Plan of Care Reviewed With: patient        Progress: improving  No c/o pain or soa. Patient discharging home with home o2.

## 2022-10-18 NOTE — PLAN OF CARE
Goal Outcome Evaluation:      VSS.Norco given for back pain,pt tolerated well.V paced.No acute event overnight. Possible D/C today.WCTM.

## 2022-10-18 NOTE — DISCHARGE PLACEMENT REQUEST
"Latoya Taylor (82 y.o. Female)     Date of Birth   1940    Social Security Number       Address   [address] [place] [id number]    Home Phone       MRN   [id number]       Catholic   None    Marital Status                               Admission Date   10/12/22    Admission Type   Emergency    Admitting Provider   Patel Pollard MD    Attending Provider   Patel Pollard MD    Department, Room/Bed   [address], [address]465/1       Discharge Date       Discharge Disposition   Home or Self Care    Discharge Destination                               Attending Provider: Patel Pollard MD    Allergies: Cortisone, Iodine, Penicillins, Procaine    Isolation: None   Infection: None   Code Status: CPR    Ht: 168 cm (66.14\")   Wt: 57.8 kg (127 lb 6.8 oz)    Admission Cmt: None   Principal Problem: Complete heart block (HCC) [I44.2]                 Active Insurance as of 10/12/2022     Primary Coverage     Payor Plan Insurance Group Employer/Plan Group    [employer] [facility]IC[facility]PLACEMENT [facility]IC[facility] REPLAC[facility] NON PAR [id number]     Payor Plan Address Payor Plan Phone Number Payor Plan Fax Number Effective Dates       1/1/2020 - None Entered    Subscriber Name Subscriber Birth Date Member ID       LATOYA TAYLOR 1940 [id number]                 Emergency Contacts      (Rel.) Home Phone Work Phone Mobile Phone    Roxane Luna (Sister) -- -- [phone]    Arthur Singh (Friend) -- -- [phone]              "

## 2022-10-18 NOTE — CASE MANAGEMENT/SOCIAL WORK
Case Management Discharge Note      Final Note: Plan is Home. Care through [facility]. O2@2LNC supplied through [employer] and portable tank delivered to bedside. Transport by private auto.         Selected Continued Care - Admitted Since 10/12/2022     Destination    No services have been selected for the patient.              Durable Medical Equipment Coordination complete.    Service Provider Selected Services Address Phone Fax Patient Preferred    [facility] [place] [address], [facility] [address] [phone] --          Dialysis/Infusion    No services have been selected for the patient.              Home Medical Care    No services have been selected for the patient.              Therapy    No services have been selected for the patient.              Community Resources    No services have been selected for the patient.              Community & DME    No services have been selected for the patient.                  Transportation Services  Private: Car    Final Discharge Disposition Code: 01 - home or self-care

## 2022-10-18 NOTE — DISCHARGE SUMMARY
Hospital Discharge    Patient Name: Latoya Iqbal  Age/Sex: 82 y.o. female  : 1940  MRN: [id number]    Encounter Provider: NATHANAEL Younger  Referring Provider: Patel Pollard MD  Place of Service: [facility] CARDIOLOGY  Patient Care Team:  Laure Burrows MD as PCP - General (Internal Medicine)         Date of Discharge:  10/18/2022   Date of Admit: 10/12/2022    Discharge Condition: Stable  Discharge Diagnosis:    Complete heart block (HCC)    Takotsubo cardiomyopathy    Heart block AV complete (HCC)    LBBB (left bundle branch block)    Nonrheumatic mitral valve regurgitation      Hospital Course:   Latoya Iqbal is a 82 y.o. female who presented on 10/12 with shortness of breath and chest discomfort.  Upon arrival, she was noted to have a left bundle branch block and was in complete heart block requiring a temporary pacemaker.  Troponin was elevated.  Pulmonary was consulted due to acute hypoxic respiratory failure initially requiring 15 L of oxygen.  Cardiac catheterization demonstrated a normal left main, 10% luminal irregularities of the proximal LAD, 20% mid LAD, diagonals, circumflex, and RCA free of disease.  Additionally, she had a significant outflow tract obstruction as well as severe mitral insufficiency.  Her EF was 25% and she had a large area of mid anterior apical inferoapical severe hypokinesis to akinesis.  It was felt complete heart block precipitated a Takotsubo cardiomyopathy.  The following day, echocardiogram demonstrated an EF of 31 to 35%, mild mitral stenosis, mild mitral regurgitation, functional MAC, and mild pulmonary hypertension.  Ultimately, she underwent placement of a CRT-P.  Initially, her blood pressures were soft; however, she was started on Toprol which she has tolerated.  ACE/ARB therapy will be considered in follow-up.  From a volume standpoint, she has remained compensated.  She will be discharged on low-dose Lasix.  Her  oxygenation has improved.  However, she is still requiring 2L of supplemental oxygen and qualifies for home O2.  She will need a repeat echocardiogram in 2 to 3 months for reassessment of her LV function and MR.      This morning she is resting comfortably.  She denies any shortness of breath or chest pain.  She does report soreness at her pacemaker site.  She is stable and ready for discharge.  She will return on Friday for a site check in our device clinic.  She will also follow-up with NATHANAEL Keller in 1 month.  She will follow-up with me in 1 week and with Dr. Pollard in 1 month.  I did recommend she stay off work for at least 1 month.  We will address in follow up.    ADDENDUM 10/19/2022:  Patient did not suffer a NSTEMI as evidenced by cardiac catheterization.  Elevated troponin was due to Takotsubo cardiomyopathy.  Therefore, she does not qualify for an aspirin or P2Y12.    Objective:  Temp:  [97.8 °F (36.6 °C)-98.2 °F (36.8 °C)] 97.9 °F (36.6 °C)  Heart Rate:  [76-87] 76  Resp:  [16-24] 20  BP: (139-142)/(76-82) 141/82    Intake/Output Summary (Last 24 hours) at 10/18/2022 1017  Last data filed at 10/18/2022 0754  Gross per 24 hour   Intake 0 ml   Output --   Net 0 ml     Body mass index is 20.48 kg/m².      10/12/22  1827 10/13/22  1140 10/15/22  0600   Weight: 55.8 kg (123 lb) 56 kg (123 lb 7.3 oz) 57.8 kg (127 lb 6.8 oz)     Weight change:     Physical Exam:  Constitutional: Well appearing, well developed, no acute distress   HENT: Oropharynx clear and membrane moist  Eyes: Normal conjunctiva, no sclera icterus.  Neck: Supple, no carotid bruit bilaterally.  Cardiovascular: Regular rate and rhythm, Holosystolic murmur at the apex, No bilateral lower extremity edema.  Pulmonary: Normal respiratory effort, normal lung sounds, no wheezing.  Abdominal: Soft, nontender, no hepatosplenomegaly, liver is non-pulsatile.  Neurological: Alert and orient x 3.   Skin: Warm, dry, no ecchymosis, no rash.  Psych:  Appropriate mood and affect. Normal judgment and insight.    Procedures Performed  Procedure(s):  BIVENTRICULAR DEVICE INSERTION [facility]     CARDIAC CATHETERIZATION REPORT     Procedure:Left heart catheterization, insertion of a temporary pacemaker     DATE OF PROCEDURE: 10/12/22     PROCEDURE PERFORMED BY: Patel Pollard MD, [facility]     INDICATION FOR PROCEDURE: Complete heart block shortness of breath left bundle branch block.       DESCRIPTION OF PROCEDURE: She is emergently brought to the Cath Lab critically ill verbal consent was obtained, access was gained in his right common femoral vein using using a micropuncture technique and ultrasound guidance.  6 Maldivian short sheath was placed without difficulty and a temporary pacemaker was placed under fluoroscopic guidance with good capture into the right ventricle.  We set the pacer at 80.  We then accessed the right common femoral artery using a micropuncture technique and ultrasound guidance and a safe femoral approach confirming access in the common femoral artery. A 6-Maldivian short sheath was placed without difficulty.  Intraarterial cocktail was given.  Left ventriculography was performed followed by selective injection of the left and right coronary arteries were performed using a JL-4 and JR-4 diagnostic catheter.  She was a very uncomfortable from a neck and back standpoint but otherwise patient tolerated the procedure well without early complication and EBL was minimal.  At the conclusion, the sheath were sutured in place.  She went to the CCU in critical condition  FINDINGS:     LEFT VENTRICULOGRAPHY: The LV pressure was 204/30 aortic pressure was 124/50.  This leaves us with a peak gradient of 80 across the LVOT.  There is some calcium on the aortic valve and in the aorta as well as the coronaries.  There is severely reduced global LV function EF is about 25% large area of mid anterior apical inferoapical severe hypokinesis to akinesis seems consistent  with a possible Takotsubo cardiomyopathy.  She has very hyperdynamic basilar function of the heart and I think almost a suicide left ventricle.  She also has severe mitral insufficiency     CORONARY ANGIOGRAPHY:  Left main: Normal  Left anterior descending: 10% luminal irregularities proximally 20% mid disease normal distally diagonals are free of obstructive disease  Ramus intermedius:Not present  Circumflex: Serpiginous vessel really free of significant obstructive disease  RCA: Is a dominant vessel.  Normal throughout     SUMMARY: I think she went into complete heart block this may have precipitated a Takotsubo cardiomyopathy and then when she came appear was on dopamine and that really contributed to hopefully her outflow tract obstruction although she has some calcium on her aortic valve the pigtail went across pretty easily and will she has severe mitral insufficiency     RECOMMENDATIONS: We will go to the CCU with the pacer in place may give her a little bit of diuretic and we will get an echocardiogram on her she is critically ill with a significant outflow tract obstruction as well as severe MR.  Her prognosis is guarded     Echocardiogram 10/13     •  The study is technically difficult for diagnosis.  •  Estimated left ventricular EF was in disagreement with the calculated left ventricular EF. Left ventricular ejection fraction appears to be 31 - 35%. Left ventricular systolic function is moderately decreased.  •  The left ventricular cavity is mild to moderately dilated.  •  Left ventricular wall thickness is consistent with mild concentric hypertrophy.  •  The following left ventricular wall segments are hypokinetic: mid inferior and mid inferoseptal. The following left ventricular wall segments are akinetic: apical anterior, apical lateral, apical inferior, apical septal, apex and mid anteroseptal.  •  Left ventricular diastolic function is consistent with (grade Ia w/high LAP) impaired relaxation.  •   Mild mitral valve stenosis is present with functional MAC.  •  Estimated right ventricular systolic pressure from tricuspid regurgitation is mildly elevated (35-45 mmHg).  •  Mild pulmonary hypertension is present.       Consults:  Consults     Date and Time Order Name Status Description    10/12/2022 11:12 PM Cardiology (on-call MD unless specified) Completed           Pertinent Test Results:  Results from last 7 days   Lab Units 10/16/22  0842 10/14/22  0436 10/13/22  0342 10/12/22  1835   SODIUM mmol/L 139 136 139 139   POTASSIUM mmol/L 4.1 4.7 4.7 5.1   CHLORIDE mmol/L 104 104 107 103   CO2 mmol/L 25.6 19.4* 18.1* 16.8*   BUN mg/dL 24* 35* 26* 26*   CREATININE mg/dL 0.66 1.07* 1.00 1.25*   GLUCOSE mg/dL 89 131* 198* 223*   CALCIUM mg/dL 8.7 8.4* 8.4* 10.3   AST (SGOT) U/L  --   --   --  35*   ALT (SGPT) U/L  --   --   --  10     Results from last 7 days   Lab Units 10/14/22  0436 10/13/22  0342 10/12/22  1835   TROPONIN T ng/mL 0.792* 1.020* 0.767*     Results from last 7 days   Lab Units 10/14/22  0436 10/13/22  0342 10/12/22  1950 10/12/22  1835   WBC 10*3/mm3 17.73* 9.53  --  16.64*   HEMOGLOBIN g/dL 13.7 13.8  --  16.0*   HEMOGLOBIN, POC g/dL  --   --  13.6  --    HEMATOCRIT % 40.7 39.7  --  47.3*   HEMATOCRIT POC %  --   --  40  --    PLATELETS 10*3/mm3 171 260  --  287                   Invalid input(s): LDLCALC  Results from last 7 days   Lab Units 10/12/22  1835   PROBNP pg/mL 1,242.0           Discharge Medications     Discharge Medications      New Medications      Instructions Start Date   furosemide 20 MG tablet  Commonly known as: LASIX   20 mg, Oral, Daily      metoprolol succinate XL 50 MG 24 hr tablet  Commonly known as: TOPROL-XL   50 mg, Oral, Every 24 Hours Scheduled   Start Date: October 19, 2022        Continue These Medications      Instructions Start Date   acetaminophen 500 MG tablet  Commonly known as: TYLENOL   500 mg, Oral, 4 Times Daily PRN      albuterol sulfate  (90 Base)  MCG/ACT inhaler  Commonly known as: PROVENTIL HFA;VENTOLIN HFA;PROAIR HFA   2 puffs, Inhalation, Every 4 Hours PRN      alendronate 70 MG tablet  Commonly known as: FOSAMAX   70 mg, Oral, Every 7 Days      COENZYME Q10 PO   10 mg, Oral, Daily      ferrous sulfate 325 (65 FE) MG tablet   325 mg, Oral, 2 Times Daily With Meals      fluticasone 50 MCG/ACT nasal spray  Commonly known as: FLONASE   2 sprays, Nasal, Daily      Fluticasone-Salmeterol 500-50 MCG/ACT DISKUS  Commonly known as: ADVAIR/WIXELA   1 puff, Inhalation, 2 Times Daily - RT      lidocaine 5 %  Commonly known as: LIDODERM   1 patch, Transdermal, Daily PRN, Remove & Discard patch within 12 hours or as directed by MD      loratadine 10 MG tablet  Commonly known as: CLARITIN   10 mg, Oral, Daily PRN      meclizine 25 MG tablet  Commonly known as: ANTIVERT   25 mg, Oral, 2 Times Daily PRN      omeprazole 20 MG capsule  Commonly known as: priLOSEC   20 mg, Oral, Daily      rosuvastatin 20 MG tablet  Commonly known as: CRESTOR   20 mg, Oral, Daily      tiZANidine 4 MG tablet  Commonly known as: ZANAFLEX   4 mg, Oral, Daily PRN      valACYclovir 500 MG tablet  Commonly known as: VALTREX   500 mg, Oral, Daily         Stop These Medications    amLODIPine 5 MG tablet  Commonly known as: NORVASC            Discharge Diet:    Dietary Orders (From admission, onward)     Start     Ordered    10/13/22 1825  Diet Regular  Diet Effective Now        Question:  Diet Texture / Consistency  Answer:  Regular    10/13/22 1824                Activity at Discharge:  As tolerated     Discharge disposition: home     Discharge Instructions and Follow ups:  No future appointments.  Additional Instructions for the Follow-ups that You Need to Schedule     Ambulatory Referral to Cardiac Rehab   As directed         Follow-up Information     [facility] .    Specialty: Cardiac Rehabilitation  Contact information:  [address]  [facility]  [address]  [phone]           Laure Burrwos MD .    Specialty: Internal Medicine  Contact information:  [address] [facility] [place] [id number] [phone]             Patel Pollard MD Follow up in 1 month(s).    Specialty: Cardiology  Contact information:  [address]  [place] [address] [phone]             Marcelina Patel APRN Follow up in 1 week(s).    Specialties: Nurse Practitioner, Cardiology  Contact information:  [address]  [place] [address] [phone]                         Test Results Pending at Discharge:      NATHANAEL Younger  10/18/22  10:17 EDT    Time: Discharge > 30 min

## 2022-10-19 NOTE — OUTREACH NOTE
Prep Survey    Flowsheet Row Responses   [facility] facility patient discharged from? [place]   Is LACE score < 7 ? No   Emergency Room discharge w/ pulse ox? No   Eligibility Readm Mgmt   Discharge diagnosis Near syncope shortness of breath chest discomfort**STEMI complete heart block and shock.   emergent placement of pacing catheter, coronary angiography with no significant disease, and LV angiography with severe decreased EF.     Does the patient have one of the following disease processes/diagnoses(primary or secondary)? General Surgery   Does the patient have Home health ordered? No   Is there a DME ordered? Yes   What DME was ordered? [facility] [place] -O2 and portable tank   Prep survey completed? Yes          REESE LOWE - Registered Nurse

## 2022-10-21 ENCOUNTER — TELEPHONE (OUTPATIENT)
Dept: CARDIOLOGY | Facility: CLINIC | Age: 82
End: 2022-10-21

## 2022-10-21 ENCOUNTER — CLINICAL SUPPORT NO REQUIREMENTS (OUTPATIENT)
Dept: CARDIOLOGY | Facility: CLINIC | Age: 82
End: 2022-10-21

## 2022-10-21 ENCOUNTER — READMISSION MANAGEMENT (OUTPATIENT)
Dept: CALL CENTER | Facility: HOSPITAL | Age: 82
End: 2022-10-21

## 2022-10-21 DIAGNOSIS — I44.2 HEART BLOCK AV COMPLETE: Primary | ICD-10-CM

## 2022-10-21 DIAGNOSIS — I44.2 COMPLETE HEART BLOCK: ICD-10-CM

## 2022-10-21 PROCEDURE — 93281 PM DEVICE PROGR EVAL MULTI: CPT | Performed by: INTERNAL MEDICINE

## 2022-10-21 NOTE — OUTREACH NOTE
General Surgery Week 1 Survey    Flowsheet Row Responses   [facility] patient discharged from? [place]   Does the patient have one of the following disease processes/diagnoses(primary or secondary)? General Surgery   Week 1 attempt successful? Yes   Call start time 1518   Call end time 1524   Discharge diagnosis Near syncope shortness of breath chest discomfort**STEMI complete heart block and shock.   emergent placement of pacing catheter, coronary angiography with no significant disease, and LV angiography with severe decreased EF.     Person spoke with today (if not patient) and relationship patient   Meds reviewed with patient/caregiver? Yes   Is the patient having any side effects they believe may be caused by any medication additions or changes? No   Does the patient have all medications related to this admission filled (includes all antibiotics, pain medications, etc.) Yes   Is the patient taking all medications as directed (includes completed medication regime)? Yes   Does the patient have a follow up appointment scheduled with their surgeon? Yes   Has the patient kept scheduled appointments due by today? Yes   Comments Cardiology f/u today.  She reports all went well.  She will see Dr. Wolf on 10/27   What DME was ordered[facility] at 2L   Psychosocial issues? No   Did the patient receive a copy of their discharge instructions? Yes   Nursing interventions Reviewed instructions with patient   What is the patient's perception of their health status since discharge? Improving   Is the patient/caregiver able to teach back steps to recovery at home? Set small, achievable goals for return to baseline health, Rest and rebuild strength, gradually increase activity   If the patient is a current smoker, are they able to teach back resources for cessation? Not a smoker   Is the patient/caregiver able to teach back the hierarchy of who to call/visit for symptoms/problems? PCP,  Specialist, Home health nurse, Urgent Care, ED, 911 Yes   Additional teach back comments Pt states she has 21 steps to her apartment and she can do this without getting out of breath.  She has talked with office regarding need for cardiac rehab.  She is waiting a call back.   Week 1 call completed? Yes   Wrap up additional comments Pt reports she is doing very well.   She has appts. scheduled.  Pacemaker site looks.  She denies needs          KIMBERLY ERWIN - Registered Nurse

## 2022-10-21 NOTE — TELEPHONE ENCOUNTER
Pt was seen in office today for 1 week post CRT-P device check. Device shows 184 AT/AF on counters with longest listed as 2 hr 15 mins on 10/14. 10 ATR on logbook with EGMs suggestive of AF or Aflutter-  Avg V rates stable.10 ATR available on logbook with EGM suggestive of Aflutter (seen below)- Avg V rates stable. Pt states she is not currently on AC. AT/AF burden is listed at 11%.   Kindly,   Meg Schotanus Device RN

## 2022-10-24 ENCOUNTER — TELEPHONE (OUTPATIENT)
Dept: CARDIOLOGY | Facility: CLINIC | Age: 82
End: 2022-10-24

## 2022-10-24 NOTE — TELEPHONE ENCOUNTER
Dr. Pollard, she had paroxysmal atrial flutter while hospitalized.  AC was deferred given recent CRT-P placement.  She has an appt with me on Thursday.  It appears we should probably go ahead and start AC.  If you agree I can take care of it.

## 2022-10-24 NOTE — TELEPHONE ENCOUNTER
I spoke with her.  She is agreeable with starting anticoagulation.  Due to her age and weight, she qualifies for low-dose Eliquis.  I have sent the prescription to her pharmacy.  However, she stated that it will depend on the cost.    Catherine, can you please look into this?  What with the Eliquis end up costing her?

## 2022-10-24 NOTE — TELEPHONE ENCOUNTER
Attempted to call the patient at the mobile number listed.  Unfortunately she did not answer and her mailbox is full.  I left a voicemail for her Sister Roxane for either she or the patient to call me.

## 2022-11-02 ENCOUNTER — TELEPHONE (OUTPATIENT)
Dept: CARDIAC REHAB | Facility: HOSPITAL | Age: 82
End: 2022-11-02

## 2022-11-02 NOTE — TELEPHONE ENCOUNTER
Patient would like to first discuss with her cardiologist about attending the cardiac rehab program. Provided patient with our contact information.

## 2022-11-08 ENCOUNTER — OFFICE VISIT (OUTPATIENT)
Dept: CARDIOLOGY | Facility: CLINIC | Age: 82
End: 2022-11-08

## 2022-11-08 VITALS — HEART RATE: 78 BPM

## 2022-11-08 DIAGNOSIS — I34.0 NONRHEUMATIC MITRAL VALVE REGURGITATION: ICD-10-CM

## 2022-11-08 DIAGNOSIS — I51.81 TAKOTSUBO CARDIOMYOPATHY: Primary | ICD-10-CM

## 2022-11-08 DIAGNOSIS — I48.0 PAROXYSMAL ATRIAL FIBRILLATION: ICD-10-CM

## 2022-11-08 DIAGNOSIS — I44.2 COMPLETE HEART BLOCK: ICD-10-CM

## 2022-11-08 PROBLEM — Z95.0 CARDIAC RESYNCHRONIZATION THERAPY PACEMAKER (CRT-P) IN PLACE: Status: ACTIVE | Noted: 2022-11-08

## 2022-11-08 PROCEDURE — 93000 ELECTROCARDIOGRAM COMPLETE: CPT | Performed by: NURSE PRACTITIONER

## 2022-11-08 PROCEDURE — 99024 POSTOP FOLLOW-UP VISIT: CPT | Performed by: NURSE PRACTITIONER

## 2022-11-08 NOTE — PROGRESS NOTES
Date of Office Visit: 2022  Encounter Provider: NATHANAEL Younger  Place of Service: [facility]  Patient Name: Latoya Iqbal  :1940    Chief Complaint   Patient presents with   • Cardiomyopathy     1 week hosp follow up   :     HPI: Latoya Iqbal is a 82 y.o. female.  On 10/12, she presented to the ED with shortness of breath and chest discomfort.    Upon arrival, she was noted to have a left bundle branch block and was in complete heart block requiring a temporary pacemaker.  Troponin was elevated.  Pulmonary was consulted due to acute hypoxic respiratory failure initially requiring 15 L of oxygen.  Cardiac catheterization demonstrated a normal left main, 10% luminal irregularities of the proximal LAD, 20% mid LAD, diagonals, circumflex, and RCA free of disease.  Additionally, she had a significant outflow tract obstruction as well as severe mitral insufficiency.  Her EF was 25% and she had a large area of mid anterior apical inferoapical severe hypokinesis to akinesis.  It was felt complete heart block precipitated a Takotsubo cardiomyopathy.  The following day, echocardiogram demonstrated an EF of 31 to 35%, mild mitral stenosis, mild mitral regurgitation, functional MAC, and mild pulmonary hypertension.  Ultimately, she underwent placement of a CRT-P.  On 10/18, she was stable for discharge.     Overall, she is feeling well.  She denies any shortness of breath, chest pain, palpitations, edema, dizziness, syncope, bleeding difficulties or melena.  She does report soreness in her left chest.     Past Medical History:   Diagnosis Date   • Cancer (HCC)    • CHF (congestive heart failure) ([facility])    • COPD (chronic obstructive pulmonary disease) ([facility])    • Elevated cholesterol    • GERD (gastroesophageal reflux disease)    • Hypertension        Past Surgical History:   Procedure Laterality Date   • APPENDECTOMY         • CARDIAC CATHETERIZATION     • CARDIAC  CATHETERIZATION N/A 10/12/2022    Procedure: Left Heart Cath;  Surgeon: Patel Pollard MD;  Location: [facility] CATH INVASIVE LOCATION;  Service: Cardiovascular;  Laterality: N/A;   • CARDIAC CATHETERIZATION N/A 10/12/2022    Procedure: Left ventriculography;  Surgeon: Patel Pollard MD;  Location: [facility] CATH INVASIVE LOCATION;  Service: Cardiovascular;  Laterality: N/A;   • CARDIAC ELECTROPHYSIOLOGY PROCEDURE N/A 10/12/2022    Procedure: Temporary Pacemaker;  Surgeon: Patel Pollard MD;  Location:  [facility] CATH INVASIVE LOCATION;  Service: Cardiovascular;  Laterality: N/A;   • CARDIAC ELECTROPHYSIOLOGY PROCEDURE N/A 10/13/2022    Procedure: BIVENTRICULAR DEVICE INSERTION [facility];  Surgeon: Tio Interiano MD;  Location: [facility] CATH INVASIVE LOCATION;  Service: Cardiology;  Laterality: N/A;   • HYSTERECTOMY      1977   • INSERTION OF TISSUE EXPANDER AFTER MASTECTOMY Left     1996   • MASTECTOMY      1977   • REPLACEMENT TOTAL HIP LATERAL POSITION Left     1978   • SKIN BIOPSY      multple over years   • TONSILECTOMY, ADENOIDECTOMY, BILATERAL MYRINGOTOMY AND TUBES      1962       Social History     Socioeconomic History   • Marital status:    Tobacco Use   • Smoking status: Former     Packs/day: 3.00     Years: 50.00     Pack years: 150.00     Types: Cigarettes   • Smokeless tobacco: Never   Vaping Use   • Vaping Use: Never used   Substance and Sexual Activity   • Alcohol use: Never   • Drug use: Never   • Sexual activity: Defer       History reviewed. No pertinent family history.    Review of Systems   Constitutional: Negative.   Cardiovascular: Negative.  Negative for chest pain, dyspnea on exertion, leg swelling, orthopnea, paroxysmal nocturnal dyspnea and syncope.   Respiratory: Negative.    Hematologic/Lymphatic: Negative for bleeding problem.   Musculoskeletal: Negative for falls.   Gastrointestinal: Negative for melena.   Neurological: Negative for dizziness and light-headedness.        Allergies   Allergen Reactions   • Cortisone Seizure   • Iodine Other (See Comments)   • Penicillins Unknown - Low Severity   • Procaine Seizure         Current Outpatient Medications:   •  acetaminophen (TYLENOL) 500 MG tablet, Take 1 tablet by mouth 4 (Four) Times a Day As Needed for Mild Pain., Disp: , Rfl:   •  alendronate (FOSAMAX) 70 MG tablet, Take 1 tablet by mouth Every 7 (Seven) Days., Disp: , Rfl:   •  apixaban (ELIQUIS) 2.5 MG tablet tablet, Take 1 tablet by mouth Every 12 (Twelve) Hours., Disp: 60 tablet, Rfl: 11  •  COENZYME Q10 PO, Take 10 mg by mouth Daily., Disp: , Rfl:   •  ferrous sulfate 325 (65 FE) MG tablet, Take 1 tablet by mouth 2 (Two) Times a Day With Meals., Disp: , Rfl:   •  fluticasone (FLONASE) 50 MCG/ACT nasal spray, 2 sprays into the nostril(s) as directed by provider Daily., Disp: , Rfl:   •  furosemide (LASIX) 20 MG tablet, Take 1 tablet by mouth Daily., Disp: 30 tablet, Rfl: 3  •  lidocaine (LIDODERM) 5 %, Place 1 patch on the skin as directed by provider Daily As Needed for Mild Pain or Moderate Pain. Remove & Discard patch within 12 hours or as directed by MD, Disp: , Rfl:   •  loratadine (CLARITIN) 10 MG tablet, Take 1 tablet by mouth Daily As Needed for Allergies., Disp: , Rfl:   •  meclizine (ANTIVERT) 25 MG tablet, Take 1 tablet by mouth 2 (Two) Times a Day As Needed for Dizziness., Disp: , Rfl:   •  metoprolol succinate XL (TOPROL-XL) 50 MG 24 hr tablet, Take 1 tablet by mouth Daily., Disp: 30 tablet, Rfl: 3  •  rosuvastatin (CRESTOR) 20 MG tablet, Take 1 tablet by mouth Daily., Disp: , Rfl:   •  tiZANidine (ZANAFLEX) 4 MG tablet, Take 1 tablet by mouth Daily As Needed for Muscle Spasms., Disp: , Rfl:   •  valACYclovir (VALTREX) 500 MG tablet, Take 1 tablet by mouth Daily., Disp: , Rfl:       Objective:     Vitals:    11/08/22 1436   BP: (P) 100/60   BP Location: (P) Right arm   Patient Position: (P) Sitting   Cuff Size: (P) Adult   Pulse: 78   SpO2: (P) 99%   Weight:  "(P) 56.7 kg (125 lb)   Height: (P) 167.6 cm (66\")     Body mass index is 20.18 kg/m² (pended).    PHYSICAL EXAM:    Neck:      Vascular: No JVD.   Pulmonary:      Effort: Pulmonary effort is normal.      Breath sounds: Normal breath sounds.   Cardiovascular:      Normal rate. Regular rhythm.      Murmurs: There is a systolic murmur.      No gallop. No click. No rub.   Pulses:     Intact distal pulses.   Skin:               ECG 12 Lead    Date/Time: 11/8/2022 2:46 PM  Performed by: Marcelina Patel APRN  Authorized by: Marcelina Patel APRN   Comparison: compared with previous ECG from 10/14/2022  Similar to previous ECG  Rhythm: paced  Rate: normal  BPM: 78  Pacing: ventricular paced rhythm            Assessment:       Diagnosis Plan   1. Takotsubo cardiomyopathy  ECG 12 Lead      2. Nonrheumatic mitral valve regurgitation        3. Complete heart block (HCC)        4. Paroxysmal atrial fibrillation (HCC)          Orders Placed This Encounter   Procedures   • ECG 12 Lead     This order was created via procedure documentation     Order Specific Question:   Release to patient     Answer:   Routine Release          Plan:       1.  Takotsubo cardiomyopathy.  EF 25%.  She appears euvolemic.  She is on Toprol.   Unfortunately, low blood pressure precludes further optimization of GDMT.      2.  Mitral regurgitation.  Echocardiogram from 10/13 demonstrated severe MAC, mild MR, and mild MS.      3.  Complete heart block/paroxysmal atrial fibrillation.  Status post CRT-P.  One week device check in clinic demonstrated normal device function.  It also demonstrated evidence of atrial fibrillation.  Subsequently, she was started on low-dose Eliquis.      I think she is doing well.  She will need a repeat echocardiogram in 1 to 2 months.  I am not making any changes today, and she will follow-up with Dr. Interiano on 11/22 and with Dr. Pollard in 1 month.      As always, it has been a pleasure to participate in your patient's " care.      Sincerely,         NATHANAEL Saleh

## 2022-11-10 ENCOUNTER — TELEPHONE (OUTPATIENT)
Dept: CARDIOLOGY | Facility: CLINIC | Age: 82
End: 2022-11-10

## 2022-11-10 DIAGNOSIS — J44.9 CHRONIC OBSTRUCTIVE PULMONARY DISEASE, UNSPECIFIED COPD TYPE: Primary | ICD-10-CM

## 2022-11-10 DIAGNOSIS — R09.02 HYPOXEMIA: ICD-10-CM

## 2022-11-10 NOTE — TELEPHONE ENCOUNTER
Where did she get them before?  I'm not sure how to prescribe oxygen, this took place when she was discharged from the hospital with the help of the discharge planner.    I also think she needs to follow up with pulmonary if she is still requiring oxygen.

## 2022-11-10 NOTE — TELEPHONE ENCOUNTER
The patient called today regarding oxygen tanks, she is unable to get the ones she needs from [facility]. Even though you discussed stopping the oxygen the patient feels she still needs it. Her oxygen when she isnt doing anything is around 95% but she says it oftern falls below 90% and she will start the oxygen. She would like her prescription sent to [facility]    Fax# [phone]

## 2022-11-18 ENCOUNTER — TELEPHONE (OUTPATIENT)
Dept: CARDIOLOGY | Facility: CLINIC | Age: 82
End: 2022-11-18

## 2022-11-18 NOTE — TELEPHONE ENCOUNTER
I spoke with the patient's friend Arthur because I was unable to get a hold of the patient and her mailbox was full.  I explained that I could not complete the oxygen order without the objective data needed, which is why she would need to come into the office.  He said he didn't feel she really needed it anymore anyway.  He will relay this information to the patient.

## 2022-11-18 NOTE — TELEPHONE ENCOUNTER
The patient called regarding oxygen. I tole her in order for us to complete form she will need to come in a do a walk test. She declined. She would like to discuss with this with you

## 2022-11-21 NOTE — TELEPHONE ENCOUNTER
I spoke with her.  There has been a lot of back and forth and miscommunication regarding the supplemental oxygen.  The short of it is that she wants us to send an order to a different oxygen company.  In order to do that, we need objective data to submit.      She has an appt with Dr. Interiano tomorrow and wants to discuss it with him at that time.  I told her I would pass the message long to Dr. Interiano and his medical assistant so that we could complete the walking oximetry test tomorrow while she is here.

## 2022-11-22 ENCOUNTER — CLINICAL SUPPORT NO REQUIREMENTS (OUTPATIENT)
Dept: CARDIOLOGY | Facility: CLINIC | Age: 82
End: 2022-11-22

## 2022-11-22 ENCOUNTER — OFFICE VISIT (OUTPATIENT)
Dept: CARDIOLOGY | Facility: CLINIC | Age: 82
End: 2022-11-22

## 2022-11-22 VITALS
HEART RATE: 88 BPM | HEIGHT: 66 IN | DIASTOLIC BLOOD PRESSURE: 82 MMHG | BODY MASS INDEX: 18.96 KG/M2 | SYSTOLIC BLOOD PRESSURE: 120 MMHG | WEIGHT: 118 LBS

## 2022-11-22 DIAGNOSIS — I48.0 PAROXYSMAL ATRIAL FIBRILLATION: ICD-10-CM

## 2022-11-22 DIAGNOSIS — I44.2 COMPLETE HEART BLOCK: Primary | ICD-10-CM

## 2022-11-22 DIAGNOSIS — Z95.0 CARDIAC RESYNCHRONIZATION THERAPY PACEMAKER (CRT-P) IN PLACE: ICD-10-CM

## 2022-11-22 PROCEDURE — 93281 PM DEVICE PROGR EVAL MULTI: CPT | Performed by: INTERNAL MEDICINE

## 2022-11-22 PROCEDURE — 99024 POSTOP FOLLOW-UP VISIT: CPT | Performed by: INTERNAL MEDICINE

## 2022-11-22 PROCEDURE — 93000 ELECTROCARDIOGRAM COMPLETE: CPT | Performed by: INTERNAL MEDICINE

## 2022-11-22 NOTE — PROGRESS NOTES
Date of Office Visit: 2022  Encounter Provider: Tio Interiano MD  Place of Service: [facility] CARDIOLOG[facility]  Patient Name: Latoya Iqbal  :1940    Chief Complaint   Patient presents with   • complete heart  block     1 month BHE f/u   • takotsubo cardiomyopathy   • LBBB   • Pacemaker Check   :     HPI: Latoya Iqbal is a 82 y.o. female who presents today for follow-up from recently CRT placement for complete heart block and acute heart failure.     She appears well, she is still requiring some oxygen with exertion, such as climbing the stair to her residence.     The incision has healed well, there is a spitting suture.      She has not signs of heart failure on exam today.    She cannot continue to follow here for insurance reasons.     Device interrogation demonstrates normal function.  A small burden of atrial fibrillation.  She is on apixaban.           Past Medical History:   Diagnosis Date   • Cancer (HCC)    • CHF (congestive heart failure) (HCC)    • COPD (chronic obstructive pulmonary disease) (HCC)    • Elevated cholesterol    • GERD (gastroesophageal reflux disease)    • Hypertension        Past Surgical History:   Procedure Laterality Date   • APPENDECTOMY         • CARDIAC CATHETERIZATION     • CARDIAC CATHETERIZATION N/A 10/12/2022    Procedure: Left Heart Cath;  Surgeon: Patel Pollard MD;  Location: [facility] INVASIVE LOCATION;  Service: Cardiovascular;  Laterality: N/A;   • CARDIAC CATHETERIZATION N/A 10/12/2022    Procedure: Left ventriculography;  Surgeon: Patel Pollard MD;  Location: [facility] CATH INVASIVE LOCATION;  Service: Cardiovascular;  Laterality: N/A;   • CARDIAC ELECTROPHYSIOLOGY PROCEDURE N/A 10/12/2022    Procedure: Temporary Pacemaker;  Surgeon: Patel Pollard MD;  Location: [facility] CATH INVASIVE LOCATION;  Service: Cardiovascular;  Laterality: N/A;   • CARDIAC ELECTROPHYSIOLOGY PROCEDURE N/A 10/13/2022    Procedure: BIVENTRICULAR  DEVICE INSERTION [facility];  Surgeon: Tio Interiano MD;  Location: [facility] INVASIVE LOCATION;  Service: Cardiology;  Laterality: N/A;   • HYSTERECTOMY      1977   • INSERTION OF TISSUE EXPANDER AFTER MASTECTOMY Left     1996   • MASTECTOMY      1977   • REPLACEMENT TOTAL HIP LATERAL POSITION Left     1978   • SKIN BIOPSY      multple over years   • TONSILECTOMY, ADENOIDECTOMY, BILATERAL MYRINGOTOMY AND TUBES      1962       Social History     Socioeconomic History   • Marital status:    Tobacco Use   • Smoking status: Former     Packs/day: 3.00     Years: 50.00     Pack years: 150.00     Types: Cigarettes   • Smokeless tobacco: Never   Vaping Use   • Vaping Use: Never used   Substance and Sexual Activity   • Alcohol use: Never   • Drug use: Never   • Sexual activity: Defer       History reviewed. No pertinent family history.    Review of Systems   Constitutional: Negative.   Cardiovascular: Negative.    Respiratory: Positive for shortness of breath.    Gastrointestinal: Negative.        Allergies   Allergen Reactions   • Cortisone Seizure   • Iodine Other (See Comments)   • Penicillins Unknown - Low Severity   • Procaine Seizure         Current Outpatient Medications:   •  acetaminophen (TYLENOL) 500 MG tablet, Take 1 tablet by mouth 4 (Four) Times a Day As Needed for Mild Pain., Disp: , Rfl:   •  alendronate (FOSAMAX) 70 MG tablet, Take 1 tablet by mouth Every 7 (Seven) Days., Disp: , Rfl:   •  apixaban (ELIQUIS) 2.5 MG tablet tablet, Take 1 tablet by mouth Every 12 (Twelve) Hours., Disp: 60 tablet, Rfl: 11  •  COENZYME Q10 PO, Take 10 mg by mouth Daily., Disp: , Rfl:   •  ferrous sulfate 325 (65 FE) MG tablet, Take 325 mg by mouth Daily With Breakfast., Disp: , Rfl:   •  fluticasone (FLONASE) 50 MCG/ACT nasal spray, 2 sprays into the nostril(s) as directed by provider Daily., Disp: , Rfl:   •  furosemide (LASIX) 20 MG tablet, Take 1 tablet by mouth Daily., Disp: 30 tablet, Rfl: 3  •  lidocaine  "(LIDODERM) 5 %, Place 1 patch on the skin as directed by provider Daily As Needed for Mild Pain or Moderate Pain. Remove & Discard patch within 12 hours or as directed by MD, Disp: , Rfl:   •  loratadine (CLARITIN) 10 MG tablet, Take 1 tablet by mouth Daily As Needed for Allergies., Disp: , Rfl:   •  meclizine (ANTIVERT) 25 MG tablet, Take 1 tablet by mouth 2 (Two) Times a Day As Needed for Dizziness., Disp: , Rfl:   •  metoprolol succinate XL (TOPROL-XL) 50 MG 24 hr tablet, Take 1 tablet by mouth Daily., Disp: 30 tablet, Rfl: 3  •  tiZANidine (ZANAFLEX) 4 MG tablet, Take 1 tablet by mouth Daily As Needed for Muscle Spasms., Disp: , Rfl:   •  valACYclovir (VALTREX) 500 MG tablet, Take 1 tablet by mouth Daily., Disp: , Rfl:       Objective:     Vitals:    11/22/22 1316   BP: 120/82   Pulse: 88   Weight: 53.5 kg (118 lb)   Height: 167.6 cm (66\")     Body mass index is 19.05 kg/m².    PHYSICAL EXAM:    Vitals and nursing note reviewed.   Constitutional:       Appearance: Frail.   Pulmonary:      Effort: Pulmonary effort is normal.   Cardiovascular:      Normal rate. Regular rhythm.   Edema:     Peripheral edema absent.   Skin:     General: Skin is warm.   Neurological:      General: No focal deficit present.      Mental Status: Alert and oriented to person, place and time.   Psychiatric:         Attention and Perception: Attention and perception normal.             ECG 12 Lead    Date/Time: 11/22/2022 3:28 PM  Performed by: Tio Interiano MD  Authorized by: Tio Interiano MD   Comparison: compared with previous ECG from 11/8/2022  Similar to previous ECG  Rhythm: sinus rhythm and paced              Assessment:       Diagnosis Plan   1. Complete heart block (HCC)        2. Cardiac resynchronization therapy pacemaker (CRT-P) in place        3. Paroxysmal atrial fibrillation (HCC)               Plan:       CRT-P with normal electrical function.  Small burden of atrial fibrillation, no associated " symptoms    She is having to transfer to [place] due to insurance issues.  I spoke with Dr. Gage to coordinate her care    She will need a follow-up echo and further titration of her heart failure medication.     As always, it has been a pleasure to participate in your patient's care.      Sincerely,         Tio Interiano MD

## 2023-01-03 RX ORDER — METOPROLOL SUCCINATE 50 MG/1
TABLET, EXTENDED RELEASE ORAL
Qty: 30 TABLET | Refills: 3 | Status: SHIPPED | OUTPATIENT
Start: 2023-01-03

## 2023-01-03 RX ORDER — FUROSEMIDE 20 MG/1
TABLET ORAL
Qty: 30 TABLET | Refills: 3 | Status: SHIPPED | OUTPATIENT
Start: 2023-01-03

## 2023-03-06 ENCOUNTER — TELEPHONE (OUTPATIENT)
Dept: CARDIOLOGY | Facility: CLINIC | Age: 83
End: 2023-03-06
Payer: MEDICARE

## 2023-03-06 NOTE — TELEPHONE ENCOUNTER
Patient left voicemail regarding issues with a bill she received in the mail.    Forwarded her message to Latoya Khalil and Babita Varela in our billing dept.

## (undated) DEVICE — GW EMR FIX EXCHG J STD .035 3MM 260CM

## (undated) DEVICE — LOU PACE DEFIB: Brand: MEDLINE INDUSTRIES, INC.

## (undated) DEVICE — KT MANIFLD CARDIAC

## (undated) DEVICE — INTRO SHEATH PRELUDE SNAP .038 9F 13CM W/SDPRT BLK

## (undated) DEVICE — CATH WORLEY STD 9F40CM

## (undated) DEVICE — BALN CORNRY SINUS 6F 1X80CM

## (undated) DEVICE — INTRO SHEATH PRELUDE SNAP .038 6F 13CM W/SDPRT

## (undated) DEVICE — RADIFOCUS GLIDEWIRE: Brand: GLIDEWIRE

## (undated) DEVICE — CATH DIAG IMPULSE FL3.5 5F 100CM

## (undated) DEVICE — GW CHOICE PT PLS .014 182CM

## (undated) DEVICE — Device

## (undated) DEVICE — KT 2 CONTRST ADMIN

## (undated) DEVICE — ADHS SKIN SURG TISS VISC PREMIERPRO EXOFIN HI/VISC FAST/DRY

## (undated) DEVICE — VEIN SELCT HK 75CM

## (undated) DEVICE — PK CATH CARD 40

## (undated) DEVICE — CATH ELECTRD PACE TEMP BI NONHEP 5F110CM

## (undated) DEVICE — CATH PACE CARD SITE/SPEC RT/ATRIUM RT/VENT SEPTL DIL EXT/HK

## (undated) DEVICE — INTRO LAT VEIN WORLEY ADV RENAL 5.5F 62CM

## (undated) DEVICE — TBG PENCL TELESCP MEGADYNE SMOKE EVAC 10FT

## (undated) DEVICE — DISPOSABLE ADAPTER

## (undated) DEVICE — TRY INTRO PERC 6F

## (undated) DEVICE — INTRO SHEATH ART/FEM ENGAGE .035 6F12CM

## (undated) DEVICE — CATH DIAG IMPULSE FR4 5F 100CM

## (undated) DEVICE — GUIDE WIRE WITH HYDROPHILIC COATING: Brand: ACUITY WHISPER VIEW™